# Patient Record
Sex: FEMALE | Race: WHITE | Employment: UNEMPLOYED
[De-identification: names, ages, dates, MRNs, and addresses within clinical notes are randomized per-mention and may not be internally consistent; named-entity substitution may affect disease eponyms.]

---

## 2017-02-06 ENCOUNTER — OFFICE VISIT (OUTPATIENT)
Dept: INTERNAL MEDICINE | Facility: CLINIC | Age: 72
End: 2017-02-06

## 2017-02-06 VITALS
TEMPERATURE: 98.1 F | SYSTOLIC BLOOD PRESSURE: 126 MMHG | RESPIRATION RATE: 16 BRPM | DIASTOLIC BLOOD PRESSURE: 70 MMHG | BODY MASS INDEX: 32.98 KG/M2 | WEIGHT: 179.2 LBS | HEART RATE: 72 BPM

## 2017-02-06 DIAGNOSIS — E11.8 TYPE 2 DIABETES MELLITUS WITH COMPLICATION, WITHOUT LONG-TERM CURRENT USE OF INSULIN (HCC): Primary | ICD-10-CM

## 2017-02-06 LAB — HBA1C MFR BLD: 7.3 %

## 2017-02-06 PROCEDURE — 83036 HEMOGLOBIN GLYCOSYLATED A1C: CPT | Performed by: INTERNAL MEDICINE

## 2017-02-06 PROCEDURE — 99213 OFFICE O/P EST LOW 20 MIN: CPT | Performed by: INTERNAL MEDICINE

## 2017-02-06 RX ORDER — GLIMEPIRIDE 2 MG/1
TABLET ORAL
Qty: 90 TABLET | Refills: 1 | Status: SHIPPED | OUTPATIENT
Start: 2017-02-06 | End: 2017-03-14 | Stop reason: SDUPTHER

## 2017-02-06 ASSESSMENT — PATIENT HEALTH QUESTIONNAIRE - PHQ9
SUM OF ALL RESPONSES TO PHQ QUESTIONS 1-9: 0
SUM OF ALL RESPONSES TO PHQ9 QUESTIONS 1 & 2: 0
1. LITTLE INTEREST OR PLEASURE IN DOING THINGS: 0
2. FEELING DOWN, DEPRESSED OR HOPELESS: 0

## 2017-02-22 ENCOUNTER — TELEPHONE (OUTPATIENT)
Dept: INTERNAL MEDICINE | Facility: CLINIC | Age: 72
End: 2017-02-22

## 2017-03-14 RX ORDER — LOSARTAN POTASSIUM 100 MG/1
TABLET ORAL
Qty: 30 TABLET | Refills: 2 | Status: SHIPPED | OUTPATIENT
Start: 2017-03-14 | End: 2017-06-08 | Stop reason: SDUPTHER

## 2017-03-14 RX ORDER — OMEPRAZOLE 20 MG/1
CAPSULE, DELAYED RELEASE ORAL
Qty: 60 CAPSULE | Refills: 2 | Status: SHIPPED | OUTPATIENT
Start: 2017-03-14 | End: 2017-06-08 | Stop reason: SDUPTHER

## 2017-03-14 RX ORDER — RALOXIFENE HYDROCHLORIDE 60 MG/1
TABLET, FILM COATED ORAL
Qty: 30 TABLET | Refills: 2 | Status: SHIPPED | OUTPATIENT
Start: 2017-03-14 | End: 2017-06-08 | Stop reason: SDUPTHER

## 2017-03-14 RX ORDER — METFORMIN HYDROCHLORIDE 500 MG/1
TABLET, EXTENDED RELEASE ORAL
Qty: 120 TABLET | Refills: 2 | Status: SHIPPED | OUTPATIENT
Start: 2017-03-14 | End: 2017-06-08 | Stop reason: SDUPTHER

## 2017-03-14 RX ORDER — AMLODIPINE BESYLATE 5 MG/1
TABLET ORAL
Qty: 30 TABLET | Refills: 2 | Status: SHIPPED | OUTPATIENT
Start: 2017-03-14 | End: 2017-06-08 | Stop reason: SDUPTHER

## 2017-03-14 RX ORDER — METOPROLOL SUCCINATE 50 MG/1
TABLET, EXTENDED RELEASE ORAL
Qty: 60 TABLET | Refills: 2 | Status: SHIPPED | OUTPATIENT
Start: 2017-03-14 | End: 2017-06-08 | Stop reason: SDUPTHER

## 2017-03-14 RX ORDER — EZETIMIBE 10 MG/1
TABLET ORAL
Qty: 30 TABLET | Refills: 2 | Status: SHIPPED | OUTPATIENT
Start: 2017-03-14 | End: 2017-06-08 | Stop reason: SDUPTHER

## 2017-03-14 RX ORDER — ATORVASTATIN CALCIUM 40 MG/1
TABLET, FILM COATED ORAL
Qty: 30 TABLET | Refills: 2 | Status: SHIPPED | OUTPATIENT
Start: 2017-03-14 | End: 2017-06-08 | Stop reason: SDUPTHER

## 2017-03-14 RX ORDER — GLIMEPIRIDE 2 MG/1
TABLET ORAL
Qty: 30 TABLET | Refills: 2 | Status: SHIPPED | OUTPATIENT
Start: 2017-03-14 | End: 2017-04-04 | Stop reason: SDUPTHER

## 2017-04-04 ENCOUNTER — OFFICE VISIT (OUTPATIENT)
Dept: INTERNAL MEDICINE CLINIC | Age: 72
End: 2017-04-04
Payer: COMMERCIAL

## 2017-04-04 VITALS
TEMPERATURE: 98.1 F | DIASTOLIC BLOOD PRESSURE: 74 MMHG | RESPIRATION RATE: 16 BRPM | WEIGHT: 181.6 LBS | SYSTOLIC BLOOD PRESSURE: 136 MMHG | BODY MASS INDEX: 33.42 KG/M2 | HEART RATE: 80 BPM

## 2017-04-04 DIAGNOSIS — I10 ESSENTIAL HYPERTENSION: ICD-10-CM

## 2017-04-04 DIAGNOSIS — E11.8 TYPE 2 DIABETES MELLITUS WITH COMPLICATION, WITHOUT LONG-TERM CURRENT USE OF INSULIN (HCC): Primary | ICD-10-CM

## 2017-04-04 DIAGNOSIS — H72.91 EARDRUM RUPTURE, RIGHT: ICD-10-CM

## 2017-04-04 PROCEDURE — 99214 OFFICE O/P EST MOD 30 MIN: CPT | Performed by: INTERNAL MEDICINE

## 2017-04-04 RX ORDER — GLIMEPIRIDE 2 MG/1
2 TABLET ORAL 2 TIMES DAILY WITH MEALS
Qty: 60 TABLET | Refills: 5 | Status: SHIPPED | OUTPATIENT
Start: 2017-04-04 | End: 2017-06-14 | Stop reason: SDUPTHER

## 2017-05-08 ENCOUNTER — OFFICE VISIT (OUTPATIENT)
Dept: INTERNAL MEDICINE CLINIC | Age: 72
End: 2017-05-08
Payer: COMMERCIAL

## 2017-05-08 VITALS
RESPIRATION RATE: 25 BRPM | HEIGHT: 62 IN | TEMPERATURE: 98.1 F | HEART RATE: 105 BPM | SYSTOLIC BLOOD PRESSURE: 138 MMHG | WEIGHT: 179.2 LBS | BODY MASS INDEX: 32.97 KG/M2 | OXYGEN SATURATION: 96 % | DIASTOLIC BLOOD PRESSURE: 80 MMHG

## 2017-05-08 DIAGNOSIS — J40 BRONCHITIS: Primary | ICD-10-CM

## 2017-05-08 DIAGNOSIS — R06.2 WHEEZING: ICD-10-CM

## 2017-05-08 PROCEDURE — 96372 THER/PROPH/DIAG INJ SC/IM: CPT | Performed by: INTERNAL MEDICINE

## 2017-05-08 PROCEDURE — 99213 OFFICE O/P EST LOW 20 MIN: CPT | Performed by: INTERNAL MEDICINE

## 2017-05-08 RX ORDER — LEVALBUTEROL INHALATION SOLUTION 1.25 MG/3ML
1 SOLUTION RESPIRATORY (INHALATION) EVERY 4 HOURS PRN
Qty: 48 ML | Refills: 2 | Status: SHIPPED | OUTPATIENT
Start: 2017-05-08

## 2017-05-08 RX ORDER — AZITHROMYCIN 250 MG/1
TABLET, FILM COATED ORAL
Qty: 1 PACKET | Refills: 0 | Status: SHIPPED | OUTPATIENT
Start: 2017-05-08 | End: 2017-05-18

## 2017-05-08 RX ORDER — PREDNISONE 10 MG/1
10 TABLET ORAL
Qty: 15 TABLET | Refills: 0 | Status: SHIPPED | OUTPATIENT
Start: 2017-05-08 | End: 2017-05-13

## 2017-05-08 RX ORDER — METHYLPREDNISOLONE SODIUM SUCCINATE 125 MG/2ML
125 INJECTION, POWDER, LYOPHILIZED, FOR SOLUTION INTRAMUSCULAR; INTRAVENOUS ONCE
Status: COMPLETED | OUTPATIENT
Start: 2017-05-08 | End: 2017-05-08

## 2017-05-08 RX ORDER — LEVALBUTEROL TARTRATE 45 UG/1
1-2 AEROSOL, METERED ORAL
COMMUNITY

## 2017-05-08 RX ADMIN — METHYLPREDNISOLONE SODIUM SUCCINATE 125 MG: 125 INJECTION, POWDER, LYOPHILIZED, FOR SOLUTION INTRAMUSCULAR; INTRAVENOUS at 10:54

## 2017-06-06 ENCOUNTER — OFFICE VISIT (OUTPATIENT)
Dept: INTERNAL MEDICINE CLINIC | Age: 72
End: 2017-06-06
Payer: COMMERCIAL

## 2017-06-06 VITALS
TEMPERATURE: 98.2 F | SYSTOLIC BLOOD PRESSURE: 120 MMHG | DIASTOLIC BLOOD PRESSURE: 62 MMHG | WEIGHT: 179 LBS | RESPIRATION RATE: 24 BRPM | BODY MASS INDEX: 32.94 KG/M2 | HEART RATE: 100 BPM | HEIGHT: 62 IN

## 2017-06-06 DIAGNOSIS — E11.8 TYPE 2 DIABETES MELLITUS WITH COMPLICATION, WITHOUT LONG-TERM CURRENT USE OF INSULIN (HCC): Primary | ICD-10-CM

## 2017-06-06 DIAGNOSIS — I10 ESSENTIAL HYPERTENSION: ICD-10-CM

## 2017-06-06 DIAGNOSIS — E78.00 HIGH CHOLESTEROL: ICD-10-CM

## 2017-06-06 LAB — HBA1C MFR BLD: 8.1 %

## 2017-06-06 PROCEDURE — 83036 HEMOGLOBIN GLYCOSYLATED A1C: CPT | Performed by: INTERNAL MEDICINE

## 2017-06-06 PROCEDURE — 99214 OFFICE O/P EST MOD 30 MIN: CPT | Performed by: INTERNAL MEDICINE

## 2017-06-06 RX ORDER — FLUTICASONE PROPIONATE 50 MCG
2 SPRAY, SUSPENSION (ML) NASAL DAILY
COMMUNITY
Start: 2017-05-12 | End: 2018-08-29 | Stop reason: ALTCHOICE

## 2017-06-08 RX ORDER — METFORMIN HYDROCHLORIDE 500 MG/1
TABLET, EXTENDED RELEASE ORAL
Qty: 120 TABLET | Refills: 5 | Status: SHIPPED | OUTPATIENT
Start: 2017-06-08 | End: 2017-11-27 | Stop reason: SDUPTHER

## 2017-06-08 RX ORDER — ATORVASTATIN CALCIUM 40 MG/1
TABLET, FILM COATED ORAL
Qty: 30 TABLET | Refills: 5 | Status: SHIPPED | OUTPATIENT
Start: 2017-06-08 | End: 2017-11-27 | Stop reason: SDUPTHER

## 2017-06-08 RX ORDER — EZETIMIBE 10 MG/1
TABLET ORAL
Qty: 30 TABLET | Refills: 5 | Status: SHIPPED | OUTPATIENT
Start: 2017-06-08 | End: 2017-11-27 | Stop reason: SDUPTHER

## 2017-06-08 RX ORDER — LOSARTAN POTASSIUM 100 MG/1
TABLET ORAL
Qty: 30 TABLET | Refills: 5 | Status: SHIPPED | OUTPATIENT
Start: 2017-06-08 | End: 2017-11-27 | Stop reason: SDUPTHER

## 2017-06-08 RX ORDER — AMLODIPINE BESYLATE 5 MG/1
TABLET ORAL
Qty: 30 TABLET | Refills: 5 | Status: SHIPPED | OUTPATIENT
Start: 2017-06-08 | End: 2017-11-27 | Stop reason: SDUPTHER

## 2017-06-08 RX ORDER — RALOXIFENE HYDROCHLORIDE 60 MG/1
TABLET, FILM COATED ORAL
Qty: 30 TABLET | Refills: 5 | Status: SHIPPED | OUTPATIENT
Start: 2017-06-08 | End: 2017-11-27 | Stop reason: SDUPTHER

## 2017-06-08 RX ORDER — OMEPRAZOLE 20 MG/1
CAPSULE, DELAYED RELEASE ORAL
Qty: 60 CAPSULE | Refills: 5 | Status: SHIPPED | OUTPATIENT
Start: 2017-06-08 | End: 2017-11-27 | Stop reason: SDUPTHER

## 2017-06-08 RX ORDER — METOPROLOL SUCCINATE 50 MG/1
TABLET, EXTENDED RELEASE ORAL
Qty: 60 TABLET | Refills: 5 | Status: SHIPPED | OUTPATIENT
Start: 2017-06-08 | End: 2017-11-27 | Stop reason: SDUPTHER

## 2017-06-14 RX ORDER — GLIMEPIRIDE 2 MG/1
TABLET ORAL
Qty: 90 TABLET | Refills: 5 | Status: SHIPPED | OUTPATIENT
Start: 2017-06-14 | End: 2017-09-12 | Stop reason: ALTCHOICE

## 2017-06-22 ENCOUNTER — TELEPHONE (OUTPATIENT)
Dept: INTERNAL MEDICINE CLINIC | Age: 72
End: 2017-06-22

## 2017-06-22 RX ORDER — GLIMEPIRIDE 4 MG/1
4 TABLET ORAL 2 TIMES DAILY
Qty: 120 TABLET | Refills: 3 | Status: SHIPPED | OUTPATIENT
Start: 2017-06-22 | End: 2018-05-07 | Stop reason: SDUPTHER

## 2017-06-29 ENCOUNTER — TELEPHONE (OUTPATIENT)
Dept: INTERNAL MEDICINE CLINIC | Age: 72
End: 2017-06-29

## 2017-06-29 RX ORDER — PREDNISONE 10 MG/1
10 TABLET ORAL 3 TIMES DAILY
Qty: 15 TABLET | Refills: 0 | Status: SHIPPED | OUTPATIENT
Start: 2017-06-29 | End: 2017-07-04

## 2017-09-12 ENCOUNTER — OFFICE VISIT (OUTPATIENT)
Dept: INTERNAL MEDICINE CLINIC | Age: 72
End: 2017-09-12
Payer: COMMERCIAL

## 2017-09-12 VITALS
TEMPERATURE: 98.3 F | HEIGHT: 62 IN | HEART RATE: 96 BPM | SYSTOLIC BLOOD PRESSURE: 128 MMHG | BODY MASS INDEX: 32.76 KG/M2 | RESPIRATION RATE: 24 BRPM | WEIGHT: 178 LBS | DIASTOLIC BLOOD PRESSURE: 72 MMHG

## 2017-09-12 DIAGNOSIS — E11.9 TYPE 2 DIABETES MELLITUS WITHOUT COMPLICATION, WITHOUT LONG-TERM CURRENT USE OF INSULIN (HCC): Primary | ICD-10-CM

## 2017-09-12 DIAGNOSIS — E78.00 HIGH CHOLESTEROL: ICD-10-CM

## 2017-09-12 DIAGNOSIS — I10 ESSENTIAL HYPERTENSION: ICD-10-CM

## 2017-09-12 LAB — HBA1C MFR BLD: 7.2 %

## 2017-09-12 PROCEDURE — 83036 HEMOGLOBIN GLYCOSYLATED A1C: CPT | Performed by: INTERNAL MEDICINE

## 2017-09-12 PROCEDURE — 99214 OFFICE O/P EST MOD 30 MIN: CPT | Performed by: INTERNAL MEDICINE

## 2017-09-19 ENCOUNTER — TELEPHONE (OUTPATIENT)
Dept: INTERNAL MEDICINE CLINIC | Age: 72
End: 2017-09-19

## 2017-09-20 ENCOUNTER — NURSE ONLY (OUTPATIENT)
Dept: INTERNAL MEDICINE CLINIC | Age: 72
End: 2017-09-20
Payer: COMMERCIAL

## 2017-09-20 DIAGNOSIS — Z23 NEED FOR INFLUENZA VACCINATION: Primary | ICD-10-CM

## 2017-09-20 DIAGNOSIS — Z23 NEED FOR PNEUMOCOCCAL VACCINE: ICD-10-CM

## 2017-09-20 PROCEDURE — G0009 ADMIN PNEUMOCOCCAL VACCINE: HCPCS | Performed by: INTERNAL MEDICINE

## 2017-09-20 PROCEDURE — 90688 IIV4 VACCINE SPLT 0.5 ML IM: CPT | Performed by: INTERNAL MEDICINE

## 2017-09-20 PROCEDURE — 90732 PPSV23 VACC 2 YRS+ SUBQ/IM: CPT | Performed by: INTERNAL MEDICINE

## 2017-09-20 PROCEDURE — G0008 ADMIN INFLUENZA VIRUS VAC: HCPCS | Performed by: INTERNAL MEDICINE

## 2017-11-08 LAB
ALBUMIN SERPL-MCNC: 3.5 G/DL
ALP BLD-CCNC: 73 U/L
ALT SERPL-CCNC: 14 U/L
ANION GAP SERPL CALCULATED.3IONS-SCNC: NORMAL MMOL/L
AST SERPL-CCNC: 14 U/L
AVERAGE GLUCOSE: 140
BILIRUB SERPL-MCNC: 0.9 MG/DL (ref 0.1–1.4)
BUN BLDV-MCNC: 26 MG/DL
CALCIUM SERPL-MCNC: 9.1 MG/DL
CHLORIDE BLD-SCNC: 107 MMOL/L
CHOLESTEROL, TOTAL: 102 MG/DL
CHOLESTEROL/HDL RATIO: 3.4
CO2: 26 MMOL/L
CREAT SERPL-MCNC: 0.82 MG/DL
CREATININE, URINE: 77.86
GFR CALCULATED: NORMAL
GLUCOSE BLD-MCNC: 119 MG/DL
HBA1C MFR BLD: 6.5 %
HDLC SERPL-MCNC: 30 MG/DL (ref 35–70)
LDL CHOLESTEROL CALCULATED: 48 MG/DL (ref 0–160)
MICROALBUMIN/CREAT 24H UR: 1.2 MG/G{CREAT}
MICROALBUMIN/CREAT UR-RTO: 11.4
POTASSIUM SERPL-SCNC: 4.3 MMOL/L
SODIUM BLD-SCNC: 143 MMOL/L
TOTAL PROTEIN: 5.6
TRIGL SERPL-MCNC: 120 MG/DL
VLDLC SERPL CALC-MCNC: ABNORMAL MG/DL

## 2017-11-27 RX ORDER — OMEPRAZOLE 20 MG/1
CAPSULE, DELAYED RELEASE ORAL
Qty: 60 CAPSULE | Refills: 0 | Status: SHIPPED | OUTPATIENT
Start: 2017-11-27 | End: 2018-01-09 | Stop reason: SDUPTHER

## 2017-11-27 RX ORDER — LOSARTAN POTASSIUM 100 MG/1
TABLET ORAL
Qty: 30 TABLET | Refills: 0 | Status: SHIPPED | OUTPATIENT
Start: 2017-11-27 | End: 2018-01-09 | Stop reason: SDUPTHER

## 2017-11-27 RX ORDER — METFORMIN HYDROCHLORIDE 500 MG/1
TABLET, EXTENDED RELEASE ORAL
Qty: 120 TABLET | Refills: 0 | Status: SHIPPED | OUTPATIENT
Start: 2017-11-27 | End: 2018-01-09 | Stop reason: SDUPTHER

## 2017-11-27 RX ORDER — RALOXIFENE HYDROCHLORIDE 60 MG/1
TABLET, FILM COATED ORAL
Qty: 30 TABLET | Refills: 0 | Status: SHIPPED | OUTPATIENT
Start: 2017-11-27 | End: 2018-01-09 | Stop reason: SDUPTHER

## 2017-11-27 RX ORDER — EZETIMIBE 10 MG/1
TABLET ORAL
Qty: 30 TABLET | Refills: 0 | Status: SHIPPED | OUTPATIENT
Start: 2017-11-27 | End: 2018-01-09 | Stop reason: SDUPTHER

## 2017-11-27 RX ORDER — AMLODIPINE BESYLATE 5 MG/1
TABLET ORAL
Qty: 30 TABLET | Refills: 0 | Status: SHIPPED | OUTPATIENT
Start: 2017-11-27 | End: 2018-01-09 | Stop reason: SDUPTHER

## 2017-11-27 RX ORDER — METOPROLOL SUCCINATE 50 MG/1
TABLET, EXTENDED RELEASE ORAL
Qty: 60 TABLET | Refills: 0 | Status: SHIPPED | OUTPATIENT
Start: 2017-11-27 | End: 2018-01-09 | Stop reason: SDUPTHER

## 2017-11-27 RX ORDER — ATORVASTATIN CALCIUM 40 MG/1
TABLET, FILM COATED ORAL
Qty: 30 TABLET | Refills: 0 | Status: SHIPPED | OUTPATIENT
Start: 2017-11-27 | End: 2018-01-09 | Stop reason: SDUPTHER

## 2017-11-27 RX ORDER — SITAGLIPTIN 100 MG/1
TABLET, FILM COATED ORAL
Qty: 30 TABLET | Refills: 0 | Status: SHIPPED | OUTPATIENT
Start: 2017-11-27 | End: 2017-12-14 | Stop reason: SDUPTHER

## 2017-12-14 ENCOUNTER — OFFICE VISIT (OUTPATIENT)
Dept: INTERNAL MEDICINE CLINIC | Age: 72
End: 2017-12-14
Payer: COMMERCIAL

## 2017-12-14 VITALS
WEIGHT: 184.2 LBS | HEIGHT: 62 IN | SYSTOLIC BLOOD PRESSURE: 124 MMHG | RESPIRATION RATE: 16 BRPM | BODY MASS INDEX: 33.9 KG/M2 | HEART RATE: 92 BPM | DIASTOLIC BLOOD PRESSURE: 72 MMHG

## 2017-12-14 DIAGNOSIS — I10 ESSENTIAL HYPERTENSION: ICD-10-CM

## 2017-12-14 DIAGNOSIS — E78.00 HIGH CHOLESTEROL: ICD-10-CM

## 2017-12-14 DIAGNOSIS — E11.8 TYPE 2 DIABETES MELLITUS WITH COMPLICATION, WITHOUT LONG-TERM CURRENT USE OF INSULIN (HCC): Primary | ICD-10-CM

## 2017-12-14 PROCEDURE — 99214 OFFICE O/P EST MOD 30 MIN: CPT | Performed by: INTERNAL MEDICINE

## 2017-12-14 NOTE — PROGRESS NOTES
Montserrat Jenkins is a 71 y.o. female who presents for   Chief Complaint   Patient presents with    Diabetes     3 MONTH F/U    and follow up of chronic medical problems. Patient Active Problem List   Diagnosis    Asthma    DM (diabetes mellitus) ([address]    HTN (hypertension)     HPI  Here for follow-up on diabetes and blood pressure denies any new complaints    Current Outpatient Prescriptions   Medication Sig Dispense Refill    fluocinonide (LIDEX) 0.05 % cream Apply topically 2 times daily. 1 Tube 0    omeprazole (PRILOSEC) 20 MG delayed release capsule TAKE 2 CAPSULES EVERY MORNING 60 capsule 0    raloxifene (EVISTA) 60 MG tablet TAKE (1) TABLET EVERY MORNING 30 tablet 0    ezetimibe (ZETIA) 10 MG tablet TAKE (1) TABLET IN THE EVENING. 30 tablet 0    amLODIPine (NORVASC) 5 MG tablet TAKE (1) TABLET EVERY MORNING 30 tablet 0    atorvastatin (LIPITOR) 40 MG tablet TAKE (1) TABLET IN THE EVENING. 30 tablet 0    losartan (COZAAR) 100 MG tablet TAKE (1) TABLET EVERY MORNING 30 tablet 0    metFORMIN (GLUCOPHAGE-XR) 500 MG extended release tablet TAKE (2) TABLETS EVERY MORNING AND EVENING 120 tablet 0    metoprolol succinate (TOPROL XL) 50 MG extended release tablet TAKE (1) TABLET EVERY MORNING AND EVENING 60 tablet 0    SITagliptin (JANUVIA) 100 MG tablet Take 1 tablet by mouth daily 90 tablet 0    glimepiride (AMARYL) 4 MG tablet Take 1 tablet by mouth 2 times daily 120 tablet 3    fluticasone (FLONASE) 50 MCG/ACT nasal spray 2 sprays by Nasal route daily      levalbuterol (XOPENEX HFA) 45 MCG/ACT inhaler Inhale 1-2 puffs into the lungs      levalbuterol (XOPENEX) 1.25 MG/3ML nebulizer solution Take 3 mLs by nebulization every 4 hours as needed for Wheezing 48 mL 2    budesonide (PULMICORT FLEXHALER) 180 MCG/ACT AEPB inhaler Take 1 Inhaler by mouth 2 times daily      aspirin 81 MG tablet Take 162 mg by mouth daily      montelukast (SINGULAIR) 10 MG tablet TAKE (1) TABLET IN THE EVENING.  90 tablet 1  Vitamin D (CHOLECALCIFEROL) 1000 UNITS CAPS capsule   Take 2,000 Units by mouth daily       Glucose Blood (BLOOD GLUCOSE TEST STRIPS) STRP Test three times a day 100 strip 3    Lancets MISC Test three times a day 100 each 3    azelastine (ASTELIN) 137 MCG/SPRAY nasal spray USE (1) SPRAY IN EACH NOSTRIL ONCE DAILY AS NEEDED 1 Bottle 5    Multiple Vitamins-Minerals (CENTRUM SILVER PO) Take  by mouth.  Calcium Carbonate-Vitamin D (CALTRATE 600+D PO) Take 600 mg by mouth daily. No current facility-administered medications for this visit.         Allergies   Allergen Reactions    Clindamycin Hives    Pcn [Penicillins] Anaphylaxis    Tetracyclines & Related Hives    Molds & Smuts     Other      Allergy: Clindamycin        Past Medical History:   Diagnosis Date    Cholelithiases 06/2001    Colon polyps     Cystocele     Glucose intolerance (impaired glucose tolerance) 11/1998    Hyperlipidemia 12/1996    Hypertension     Polio     Childhood     Post-polio syndrome     RAD (reactive airway disease)     Rectocele     Seasonal allergies     Type II or unspecified type diabetes mellitus without mention of complication, not stated as uncontrolled 02/2002       Past Surgical History:   Procedure Laterality Date    APPENDECTOMY  02/1997    EYE SURGERY Bilateral 11-13    cataract    HYSTERECTOMY, VAGINAL      SALPINGO-OOPHORECTOMY  02/1997    TONSILLECTOMY AND ADENOIDECTOMY      Childhood     TOTAL KNEE ARTHROPLASTY Right 08/2008    TOTAL KNEE ARTHROPLASTY Left 10/2009       Family History   Problem Relation Age of Onset    High Blood Pressure Mother     Diabetes Paternal Grandmother      ROS   Constitutional:  Negative for fatigue, loss of appetite and unexpected weight change   HEENT            : Negative for neck stiffness and pain, no congestion or sinus pressure   Eyes                : No visual disturbance or pain   Cardiovascular: No chest pain or palpitations or leg swelling   Respiratory      : Negative for cough, shortness of breath or wheezing   Gastrointestinal: Negative for abdominal pain, constipation or diarrhea and bloating No nausea or vomiting   Genitourinary:     No urgency or frequency, no burning or hematuria   Musculoskeletal: No arthralgias, back pain or myalgias   Skin                  : Negative for rash or erythema   Neurological    : Negative for dizziness, weakness, tremors ,light headedness or syncope   Psychiatric       : Negative for dysphoric mood, sleep disturbances, nervous or anxious, or decreased concentration   All other review of systems was negative    Objective  Physical Examination:    Nursing note reviewed    /72 (Site: Left Arm, Position: Sitting, Cuff Size: Large Adult)   Pulse 92   Resp 16   Ht 5' 1.81\" (1.57 m)   Wt 184 lb 3.2 oz (83.6 kg)   LMP 01/01/1990   BMI 33.90 kg/m²   BP Readings from Last 3 Encounters:   12/14/17 124/72   09/12/17 128/72   06/06/17 120/62         Constitutional:  Farida Hernandez is oriented to place, person and time ,appears well-developed and well-nourished  HEENT:  Atraumatic and normocephalic, external ears normal bilaterally, nose normal no oropharyngeal exudate and is clear and moist  Eyes:  EOCM normal; conjunctivae normal; PERRLA bilaterally  Neck:  Normal range of motion, neck supple, no JVD and no thyromegaly  Cardiovascular:  RRR, normal heart sounds and intact distal pulses  Pulmonary:  effort normal and breath sounds normal bilaterally,no wheezes or rales, no respiratory distress  Abdominal:  Soft, non-tender; normal bowel sounds, no masses  Musculoskeletal:  Normal range of motion and no edema or tenderness bilaterally  No lymphadenopathy  Neurological:  alert, oriented, and normal reflexes bilaterally  Skin: warm and dry  Psychiatric:  normal mood and effect; behavior normal.    Labs:   Lab Results   Component Value Date    LABA1C 7.2 09/12/2017     Lab Results   Component Value Date    CHOL Dispense:  1 Tube     Refill:  0     Verbal order per Dr Carin Sweet          Completed Refills               Requested Prescriptions     Signed Prescriptions Disp Refills    fluocinonide (LIDEX) 0.05 % cream 1 Tube 0     Sig: Apply topically 2 times daily. 4. Patient given educational materials - see patient instructions    5. Was a self-tracking handout given in paper form or via [employer]? NO    No orders of the defined types were placed in this encounter. Return in about 4 months (around 4/14/2018). Patient voiced understanding and agreed to treatment plan. Electronically signed by Norma Freed MD on 12/14/2017 at 9:29 AM    This note is created with a voice recognition program and while intend to generate a document that accurately reflects the content of the visit, no guarantee can be provided that every mistake has been identified and corrected by editing.

## 2017-12-14 NOTE — PROGRESS NOTES
Chronic Disease Visit Information    BP Readings from Last 3 Encounters:   09/12/17 128/72   06/06/17 120/62   05/08/17 138/80          Hemoglobin A1C (%)   Date Value   09/12/2017 7.2   06/06/2017 8.1   02/06/2017 7.3     LDL Calculated (mg/dL)   Date Value   06/16/2016 52     HDL (mg/dL)   Date Value   06/16/2016 31 (A)     BUN (mg/dL)   Date Value   03/20/2014 22     CREATININE (no units)   Date Value   03/20/2014 0.80     Glucose (mg/dL)   Date Value   03/20/2014 125            Have you changed or started any medications since your last visit including any over-the-counter medicines, vitamins, or herbal medicines? no   Are you having any side effects from any of your medications? -  no  Have you stopped taking any of your medications? Is so, why? -  no    Have you seen any other physician or provider since your last visit? Yes - Records Requested  Have you had any other diagnostic tests since your last visit? No  Have you been seen in the emergency room and/or had an admission to a hospital since we last saw you? No  Have you had your annual diabetic retinal (eye) exam? Yes - Records Requested  Have you had your routine dental cleaning in the past 6 months? No - dec 21 next apt    Have you activated your [employer] account? If not, what are your barriers?  Yes     Patient Care Team:  Kylah Cuevas MD as PCP - General (Internal Medicine)  Feli Fernández MD as Consulting Physician (Allergy & Immunology)  Nesha Allen MD as Consulting Physician (Cardiology)         Medical History Review  Past Medical, Family, and Social History reviewed and does contribute to the patient presenting condition    Health Maintenance   Topic Date Due    Diabetic microalbuminuria test  06/15/2018 (Originally 6/16/2017)    Lipid screen  06/15/2018 (Originally 6/16/2017)    Diabetic retinal exam  09/12/2018 (Originally 5/25/1955)    Hepatitis C screen  09/12/2018 (Originally 1945)    DTaP/Tdap/Td vaccine (1 - Tdap)

## 2018-01-03 DIAGNOSIS — I10 ESSENTIAL HYPERTENSION: ICD-10-CM

## 2018-01-03 DIAGNOSIS — E11.8 TYPE 2 DIABETES MELLITUS WITH COMPLICATION, WITHOUT LONG-TERM CURRENT USE OF INSULIN (HCC): ICD-10-CM

## 2018-01-03 DIAGNOSIS — E78.00 HIGH CHOLESTEROL: ICD-10-CM

## 2018-01-09 RX ORDER — ATORVASTATIN CALCIUM 40 MG/1
TABLET, FILM COATED ORAL
Qty: 30 TABLET | Refills: 5 | Status: SHIPPED | OUTPATIENT
Start: 2018-01-09 | End: 2018-07-03 | Stop reason: SDUPTHER

## 2018-01-09 RX ORDER — METOPROLOL SUCCINATE 50 MG/1
TABLET, EXTENDED RELEASE ORAL
Qty: 60 TABLET | Refills: 5 | Status: SHIPPED | OUTPATIENT
Start: 2018-01-09 | End: 2018-07-03 | Stop reason: SDUPTHER

## 2018-01-09 RX ORDER — OMEPRAZOLE 20 MG/1
CAPSULE, DELAYED RELEASE ORAL
Qty: 60 CAPSULE | Refills: 5 | Status: SHIPPED | OUTPATIENT
Start: 2018-01-09 | End: 2018-07-03 | Stop reason: SDUPTHER

## 2018-01-09 RX ORDER — METFORMIN HYDROCHLORIDE 500 MG/1
TABLET, EXTENDED RELEASE ORAL
Qty: 120 TABLET | Refills: 5 | Status: SHIPPED | OUTPATIENT
Start: 2018-01-09 | End: 2018-07-03 | Stop reason: SDUPTHER

## 2018-01-09 RX ORDER — AMLODIPINE BESYLATE 5 MG/1
TABLET ORAL
Qty: 30 TABLET | Refills: 5 | Status: SHIPPED | OUTPATIENT
Start: 2018-01-09 | End: 2018-07-03 | Stop reason: SDUPTHER

## 2018-01-09 RX ORDER — EZETIMIBE 10 MG/1
TABLET ORAL
Qty: 30 TABLET | Refills: 5 | Status: SHIPPED | OUTPATIENT
Start: 2018-01-09 | End: 2018-07-03 | Stop reason: SDUPTHER

## 2018-01-09 RX ORDER — LOSARTAN POTASSIUM 100 MG/1
TABLET ORAL
Qty: 30 TABLET | Refills: 5 | Status: SHIPPED | OUTPATIENT
Start: 2018-01-09 | End: 2018-07-03 | Stop reason: SDUPTHER

## 2018-01-09 RX ORDER — RALOXIFENE HYDROCHLORIDE 60 MG/1
TABLET, FILM COATED ORAL
Qty: 30 TABLET | Refills: 5 | Status: SHIPPED | OUTPATIENT
Start: 2018-01-09 | End: 2018-07-03 | Stop reason: SDUPTHER

## 2018-03-28 RX ORDER — SITAGLIPTIN 100 MG/1
TABLET, FILM COATED ORAL
Qty: 30 TABLET | Refills: 0 | Status: SHIPPED | OUTPATIENT
Start: 2018-03-28 | End: 2018-05-07 | Stop reason: SDUPTHER

## 2018-04-13 ENCOUNTER — OFFICE VISIT (OUTPATIENT)
Dept: INTERNAL MEDICINE CLINIC | Age: 73
End: 2018-04-13
Payer: COMMERCIAL

## 2018-04-13 VITALS
WEIGHT: 188.6 LBS | BODY MASS INDEX: 34.71 KG/M2 | HEART RATE: 85 BPM | SYSTOLIC BLOOD PRESSURE: 124 MMHG | OXYGEN SATURATION: 95 % | DIASTOLIC BLOOD PRESSURE: 68 MMHG | HEIGHT: 62 IN | RESPIRATION RATE: 16 BRPM | TEMPERATURE: 97.7 F

## 2018-04-13 DIAGNOSIS — E11.8 TYPE 2 DIABETES MELLITUS WITH COMPLICATION, UNSPECIFIED LONG TERM INSULIN USE STATUS: Primary | ICD-10-CM

## 2018-04-13 DIAGNOSIS — I10 ESSENTIAL HYPERTENSION: ICD-10-CM

## 2018-04-13 DIAGNOSIS — E78.00 HIGH CHOLESTEROL: ICD-10-CM

## 2018-04-13 LAB
GLUCOSE BLD-MCNC: 127 MG/DL
HBA1C MFR BLD: 6.7 %

## 2018-04-13 PROCEDURE — 83036 HEMOGLOBIN GLYCOSYLATED A1C: CPT | Performed by: INTERNAL MEDICINE

## 2018-04-13 PROCEDURE — 82962 GLUCOSE BLOOD TEST: CPT | Performed by: INTERNAL MEDICINE

## 2018-04-13 PROCEDURE — 99214 OFFICE O/P EST MOD 30 MIN: CPT | Performed by: INTERNAL MEDICINE

## 2018-04-13 ASSESSMENT — PATIENT HEALTH QUESTIONNAIRE - PHQ9
SUM OF ALL RESPONSES TO PHQ QUESTIONS 1-9: 0
SUM OF ALL RESPONSES TO PHQ9 QUESTIONS 1 & 2: 0
2. FEELING DOWN, DEPRESSED OR HOPELESS: 0
1. LITTLE INTEREST OR PLEASURE IN DOING THINGS: 0

## 2018-04-16 ENCOUNTER — TELEPHONE (OUTPATIENT)
Dept: INTERNAL MEDICINE CLINIC | Age: 73
End: 2018-04-16

## 2018-04-18 ENCOUNTER — TELEPHONE (OUTPATIENT)
Dept: INTERNAL MEDICINE CLINIC | Age: 73
End: 2018-04-18

## 2018-04-25 ENCOUNTER — TELEPHONE (OUTPATIENT)
Dept: INTERNAL MEDICINE CLINIC | Age: 73
End: 2018-04-25

## 2018-04-27 ENCOUNTER — TELEPHONE (OUTPATIENT)
Dept: INTERNAL MEDICINE CLINIC | Age: 73
End: 2018-04-27

## 2018-04-27 RX ORDER — AZITHROMYCIN 250 MG/1
TABLET, FILM COATED ORAL
Qty: 1 PACKET | Refills: 0 | Status: SHIPPED | OUTPATIENT
Start: 2018-04-27 | End: 2018-08-29 | Stop reason: ALTCHOICE

## 2018-04-27 RX ORDER — PREDNISONE 10 MG/1
10 TABLET ORAL 3 TIMES DAILY
Qty: 15 TABLET | Refills: 0 | Status: SHIPPED | OUTPATIENT
Start: 2018-04-27 | End: 2018-05-02

## 2018-05-07 RX ORDER — SITAGLIPTIN 100 MG/1
TABLET, FILM COATED ORAL
Qty: 30 TABLET | Refills: 5 | Status: SHIPPED | OUTPATIENT
Start: 2018-05-07 | End: 2018-10-01 | Stop reason: SDUPTHER

## 2018-05-07 RX ORDER — GLIMEPIRIDE 4 MG/1
TABLET ORAL
Qty: 60 TABLET | Refills: 5 | Status: SHIPPED | OUTPATIENT
Start: 2018-05-07 | End: 2018-08-29 | Stop reason: ALTCHOICE

## 2018-07-04 RX ORDER — ATORVASTATIN CALCIUM 40 MG/1
TABLET, FILM COATED ORAL
Qty: 30 TABLET | Refills: 3 | Status: SHIPPED | OUTPATIENT
Start: 2018-07-04 | End: 2018-10-01 | Stop reason: SDUPTHER

## 2018-07-04 RX ORDER — EZETIMIBE 10 MG/1
TABLET ORAL
Qty: 30 TABLET | Refills: 3 | Status: SHIPPED | OUTPATIENT
Start: 2018-07-04 | End: 2018-10-01 | Stop reason: SDUPTHER

## 2018-07-04 RX ORDER — AMLODIPINE BESYLATE 5 MG/1
TABLET ORAL
Qty: 30 TABLET | Refills: 3 | Status: SHIPPED | OUTPATIENT
Start: 2018-07-04 | End: 2018-10-01 | Stop reason: SDUPTHER

## 2018-07-04 RX ORDER — OMEPRAZOLE 20 MG/1
CAPSULE, DELAYED RELEASE ORAL
Qty: 60 CAPSULE | Refills: 3 | Status: SHIPPED | OUTPATIENT
Start: 2018-07-04 | End: 2018-10-01 | Stop reason: SDUPTHER

## 2018-07-04 RX ORDER — RALOXIFENE HYDROCHLORIDE 60 MG/1
TABLET, FILM COATED ORAL
Qty: 30 TABLET | Refills: 0 | Status: SHIPPED | OUTPATIENT
Start: 2018-07-04 | End: 2018-08-01 | Stop reason: SDUPTHER

## 2018-07-04 RX ORDER — METFORMIN HYDROCHLORIDE 500 MG/1
TABLET, EXTENDED RELEASE ORAL
Qty: 120 TABLET | Refills: 3 | Status: SHIPPED | OUTPATIENT
Start: 2018-07-04 | End: 2018-10-01 | Stop reason: SDUPTHER

## 2018-07-04 RX ORDER — LOSARTAN POTASSIUM 100 MG/1
TABLET ORAL
Qty: 30 TABLET | Refills: 3 | Status: SHIPPED | OUTPATIENT
Start: 2018-07-04 | End: 2018-10-01 | Stop reason: SDUPTHER

## 2018-07-04 RX ORDER — METOPROLOL SUCCINATE 50 MG/1
TABLET, EXTENDED RELEASE ORAL
Qty: 60 TABLET | Refills: 3 | Status: SHIPPED | OUTPATIENT
Start: 2018-07-04 | End: 2018-10-01 | Stop reason: SDUPTHER

## 2018-08-02 RX ORDER — RALOXIFENE HYDROCHLORIDE 60 MG/1
TABLET, FILM COATED ORAL
Qty: 30 TABLET | Refills: 2 | Status: SHIPPED | OUTPATIENT
Start: 2018-08-02 | End: 2018-10-01 | Stop reason: SDUPTHER

## 2018-08-06 DIAGNOSIS — I10 ESSENTIAL HYPERTENSION: ICD-10-CM

## 2018-08-06 DIAGNOSIS — E78.00 HIGH CHOLESTEROL: ICD-10-CM

## 2018-08-06 DIAGNOSIS — E11.8 TYPE 2 DIABETES MELLITUS WITH COMPLICATION (HCC): ICD-10-CM

## 2018-08-29 ENCOUNTER — OFFICE VISIT (OUTPATIENT)
Dept: INTERNAL MEDICINE CLINIC | Age: 73
End: 2018-08-29
Payer: COMMERCIAL

## 2018-08-29 VITALS
DIASTOLIC BLOOD PRESSURE: 68 MMHG | WEIGHT: 182 LBS | RESPIRATION RATE: 16 BRPM | BODY MASS INDEX: 33.49 KG/M2 | OXYGEN SATURATION: 97 % | SYSTOLIC BLOOD PRESSURE: 126 MMHG | HEIGHT: 62 IN | TEMPERATURE: 98.1 F

## 2018-08-29 DIAGNOSIS — E78.00 HIGH CHOLESTEROL: ICD-10-CM

## 2018-08-29 DIAGNOSIS — I10 ESSENTIAL HYPERTENSION: ICD-10-CM

## 2018-08-29 DIAGNOSIS — E11.8 TYPE 2 DIABETES MELLITUS WITH COMPLICATION, UNSPECIFIED WHETHER LONG TERM INSULIN USE: Primary | ICD-10-CM

## 2018-08-29 PROCEDURE — 99214 OFFICE O/P EST MOD 30 MIN: CPT | Performed by: INTERNAL MEDICINE

## 2018-08-29 RX ORDER — ACETAMINOPHEN AND CODEINE PHOSPHATE 300; 30 MG/1; MG/1
TABLET ORAL
Refills: 0 | COMMUNITY
Start: 2018-08-27 | End: 2018-09-06 | Stop reason: ALTCHOICE

## 2018-08-29 RX ORDER — GLIMEPIRIDE 2 MG/1
2 TABLET ORAL 2 TIMES DAILY
COMMUNITY
End: 2019-08-23 | Stop reason: SDUPTHER

## 2018-08-29 NOTE — PROGRESS NOTES
Rob Vallejo is a 68 y.o. 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 90 g 1    raloxifene (EVISTA) 60 MG tablet TAKE (1) TABLET EVERY MORNING 30 tablet 2    omeprazole (PRILOSEC) 20 MG delayed release capsule TAKE 2 CAPSULES EVERY MORNING 60 capsule 3    ezetimibe (ZETIA) 10 MG tablet TAKE (1) TABLET IN THE EVENING. 30 tablet 3    amLODIPine (NORVASC) 5 MG tablet TAKE (1) TABLET EVERY MORNING 30 tablet 3    atorvastatin (LIPITOR) 40 MG tablet TAKE (1) TABLET IN THE EVENING.  30 tablet 3    losartan (COZAAR) 100 MG tablet TAKE (1) TABLET EVERY MORNING 30 tablet 3    metoprolol succinate (TOPROL XL) 50 MG extended release tablet TAKE (1) TABLET EVERY MORNING AND EVENING 60 tablet 3    metFORMIN (GLUCOPHAGE-XR) 500 MG extended release tablet TAKE (2) TABLETS EVERY MORNING AND EVENING 120 tablet 3    JANUVIA 100 MG tablet TAKE 1 TABLET ONCE DAILY 30 tablet 5    levalbuterol (XOPENEX HFA) 45 MCG/ACT inhaler Inhale 1-2 puffs into the lungs      levalbuterol (XOPENEX) 1.25 MG/3ML nebulizer solution Take 3 mLs by nebulization every 4 hours as needed Relation Age of Onset    High Blood Pressure Mother     Diabetes Paternal Grandmother      ROS   Constitutional:  Negative for fatigue, loss of appetite and unexpected weight change   HEENT            : Negative for neck stiffness and pain, no congestion or sinus pressure   Eyes                : No visual disturbance or pain   Cardiovascular: No chest pain or palpitations or leg swelling   Respiratory      : Negative for cough, shortness of breath or wheezing   Gastrointestinal: Negative for abdominal pain, constipation or diarrhea and bloating No nausea or vomiting   Genitourinary:     No urgency or frequency, no burning or hematuria   Musculoskeletal: No arthralgias, back pain or myalgias   Skin                  : Negative for rash or erythema   Neurological    : Negative for dizziness, weakness, tremors ,light headedness or syncope   Psychiatric       : Negative for dysphoric mood, sleep disturbances, nervous or anxious, or decreased concentration   All other review of systems was negative    Objective  Physical Examination:    Nursing note reviewed    /68 (Site: Left Arm, Position: Sitting, Cuff Size: Large Adult)   Temp 98.1 °F (36.7 °C) (Oral)   Resp 16   Ht 5' 2\" (1.575 m)   Wt 182 lb (82.6 kg)   LMP 01/01/1990   SpO2 97%   Breastfeeding?  No   BMI 33.29 kg/m²   BP Readings from Last 3 Encounters:   08/29/18 126/68   04/13/18 124/68   12/14/17 124/72         Constitutional:  Henna Mariscal is oriented to place, person and time ,appears well-developed and well-nourished  HEENT:  Basal cell removed and ecchymosis present on the right side and normocephalic, external ears normal bilaterally, nose normal no oropharyngeal exudate and is clear and moist  Eyes:  EOCM normal; conjunctivae normal; PERRLA bilaterally  Neck:  Normal range of motion, neck supple, no JVD and no thyromegaly  Cardiovascular:  RRR, normal heart sounds and intact distal pulses  Pulmonary:  effort normal and breath sounds normal bilaterally,no wheezes or rales, no respiratory distress  Abdominal:  Soft, non-tender; normal bowel sounds, no masses  Musculoskeletal:  Normal range of motion and no edema or tenderness bilaterally  No lymphadenopathy  Neurological:  alert, oriented, and normal reflexes bilaterally  Skin: warm and dry  Psychiatric:  normal mood and effect; behavior normal.    Labs:   Lab Results   Component Value Date    LABA1C 6.7 04/13/2018     Lab Results   Component Value Date    CHOL 102 11/08/2017     Lab Results   Component Value Date    HDL 30 (A) 11/08/2017     Lab Results   Component Value Date    LDLCALC 48 11/08/2017     Lab Results   Component Value Date    TRIG 120 11/08/2017     No components found for: CHOLHDL  No results found for: WBC, HGB, HCT, MCV, PLT  No results found for: INR, PROTIME  Lab Results   Component Value Date    GLUCOSE 127 04/13/2018    CREATININE 0.82 11/08/2017    BUN 26 11/08/2017     11/08/2017    K 4.3 11/08/2017     11/08/2017    CO2 26 11/08/2017     Lab Results   Component Value Date    ALT 14 11/08/2017    AST 14 11/08/2017    ALKPHOS 73 11/08/2017    BILITOT 0.9 11/08/2017     Lab Results   Component Value Date    LABALBU 3.5 11/08/2017     No results found for: TSH, CBC  Assessment:  1. Type 2 diabetes mellitus with complication, unspecified whether long term insulin use ([facility]    2. Essential hypertension    3.  High cholesterol        Plan:  Patient's recent hemoglobin A1c 6.6 compared to 6.5 last time and patient's blood sugars are reviewed which were excellent in the last 2-3 months and will continue current treatment including the glimepiride 2 mg tablet 1 in the morning and 1 in the evening and metformin  Patient's blood pressure stable on current medications  Last LDL 52 and HDL 34 and continue current treatment  She had a diabetic foot exam today  Review in 4 months             1.  Shu Olson received counseling on the following healthy behaviors: nutrition and exercise    2. Prior labs and health maintenance reviewed. 3.  Discussed use, benefit, and side effects of prescribed medications. Barriers to medication compliance addressed. All her questions were answered. Pt voiced understanding. Manjula Lu will continue current medications, diet and exercise. Orders Placed This Encounter   Medications    fluocinonide (LIDEX) 0.05 % cream     Sig: Apply topically 2 times daily. Dispense:  90 g     Refill:  1     Verbal order per Dr Chelsey Martines          Completed Refills               Requested Prescriptions     Signed Prescriptions Disp Refills    fluocinonide (LIDEX) 0.05 % cream 90 g 1     Sig: Apply topically 2 times daily. 4. Patient given educational materials - see patient instructions    5. Was a self-tracking handout given in paper form or via [employer]? NO    Orders Placed This Encounter   Procedures    HM DIABETES FOOT EXAM     No Follow-up on file. Patient voiced understanding and agreed to treatment plan. Electronically signed by Sonido Francisco MD on 8/29/2018 at 2:52 PM    This note is created with a voice recognition program and while intend to generate a document that accurately reflects the content of the visit, no guarantee can be provided that every mistake has been identified and corrected by editing.

## 2018-09-06 ENCOUNTER — OFFICE VISIT (OUTPATIENT)
Dept: INTERNAL MEDICINE CLINIC | Age: 73
End: 2018-09-06
Payer: COMMERCIAL

## 2018-09-06 VITALS
BODY MASS INDEX: 33.82 KG/M2 | HEART RATE: 74 BPM | HEIGHT: 62 IN | TEMPERATURE: 98.4 F | RESPIRATION RATE: 16 BRPM | SYSTOLIC BLOOD PRESSURE: 128 MMHG | OXYGEN SATURATION: 97 % | DIASTOLIC BLOOD PRESSURE: 74 MMHG | WEIGHT: 183.8 LBS

## 2018-09-06 DIAGNOSIS — J02.9 PHARYNGITIS, UNSPECIFIED ETIOLOGY: Primary | ICD-10-CM

## 2018-09-06 LAB — S PYO AG THROAT QL: NORMAL

## 2018-09-06 PROCEDURE — 99213 OFFICE O/P EST LOW 20 MIN: CPT | Performed by: INTERNAL MEDICINE

## 2018-09-06 PROCEDURE — 87880 STREP A ASSAY W/OPTIC: CPT | Performed by: INTERNAL MEDICINE

## 2018-09-06 RX ORDER — AZITHROMYCIN 250 MG/1
TABLET, FILM COATED ORAL
Qty: 1 PACKET | Refills: 0 | Status: SHIPPED | OUTPATIENT
Start: 2018-09-06 | End: 2018-09-10

## 2018-09-06 RX ORDER — EZETIMIBE 10 MG/1
TABLET ORAL
COMMUNITY
End: 2018-09-06 | Stop reason: SDUPTHER

## 2018-09-06 NOTE — PROGRESS NOTES
Maninder Chakraborty is a 68 y.o. female who presents for   Chief Complaint   Patient presents with    Pharyngitis     Pt said her sore throat started yesterday and this morning she felt her throat was closing off.    and follow up of chronic medical problems. Patient Active Problem List   Diagnosis    Asthma    DM (diabetes mellitus) ([address]    HTN (hypertension)     HPI  Here for evaluation of sore throat and redness swallowing    Current Outpatient Prescriptions   Medication Sig Dispense Refill    glimepiride (AMARYL) 2 MG tablet Take 2 mg by mouth 2 times daily      fluocinonide (LIDEX) 0.05 % cream Apply topically 2 times daily. 90 g 1    raloxifene (EVISTA) 60 MG tablet TAKE (1) TABLET EVERY MORNING 30 tablet 2    omeprazole (PRILOSEC) 20 MG delayed release capsule TAKE 2 CAPSULES EVERY MORNING 60 capsule 3    ezetimibe (ZETIA) 10 MG tablet TAKE (1) TABLET IN THE EVENING. 30 tablet 3    amLODIPine (NORVASC) 5 MG tablet TAKE (1) TABLET EVERY MORNING 30 tablet 3    atorvastatin (LIPITOR) 40 MG tablet TAKE (1) TABLET IN THE EVENING. 30 tablet 3    losartan (COZAAR) 100 MG tablet TAKE (1) TABLET EVERY MORNING 30 tablet 3    metoprolol succinate (TOPROL XL) 50 MG extended release tablet TAKE (1) TABLET EVERY MORNING AND EVENING 60 tablet 3    metFORMIN (GLUCOPHAGE-XR) 500 MG extended release tablet TAKE (2) TABLETS EVERY MORNING AND EVENING 120 tablet 3    JANUVIA 100 MG tablet TAKE 1 TABLET ONCE DAILY 30 tablet 5    levalbuterol (XOPENEX HFA) 45 MCG/ACT inhaler Inhale 1-2 puffs into the lungs      levalbuterol (XOPENEX) 1.25 MG/3ML nebulizer solution Take 3 mLs by nebulization every 4 hours as needed for Wheezing 48 mL 2    budesonide (PULMICORT FLEXHALER) 180 MCG/ACT AEPB inhaler Take 1 Inhaler by mouth 2 times daily      aspirin 81 MG tablet Take 162 mg by mouth daily      montelukast (SINGULAIR) 10 MG tablet TAKE (1) TABLET IN THE EVENING.  90 tablet 1    Vitamin D (CHOLECALCIFEROL) 1000 UNITS CAPS
11/08/2018    Lipid screen  11/08/2018    A1C test (Diabetic or Prediabetic)  04/13/2019    Potassium monitoring  08/02/2019    Creatinine monitoring  08/02/2019    Diabetic foot exam  08/29/2019    Breast cancer screen  06/18/2020    Colon cancer screen colonoscopy  02/25/2023    DEXA (modify frequency per FRAX score)  Addressed    Pneumococcal low/med risk  Completed

## 2018-10-02 RX ORDER — OMEPRAZOLE 20 MG/1
CAPSULE, DELAYED RELEASE ORAL
Qty: 60 CAPSULE | Refills: 5 | Status: SHIPPED | OUTPATIENT
Start: 2018-10-02 | End: 2019-04-30 | Stop reason: SDUPTHER

## 2018-10-02 RX ORDER — ATORVASTATIN CALCIUM 40 MG/1
TABLET, FILM COATED ORAL
Qty: 30 TABLET | Refills: 5 | Status: SHIPPED | OUTPATIENT
Start: 2018-10-02 | End: 2019-04-30 | Stop reason: SDUPTHER

## 2018-10-02 RX ORDER — METOPROLOL SUCCINATE 50 MG/1
TABLET, EXTENDED RELEASE ORAL
Qty: 60 TABLET | Refills: 5 | Status: SHIPPED | OUTPATIENT
Start: 2018-10-02 | End: 2019-04-30 | Stop reason: SDUPTHER

## 2018-10-02 RX ORDER — LOSARTAN POTASSIUM 100 MG/1
TABLET ORAL
Qty: 30 TABLET | Refills: 5 | Status: SHIPPED | OUTPATIENT
Start: 2018-10-02 | End: 2019-04-30 | Stop reason: SDUPTHER

## 2018-10-02 RX ORDER — AMLODIPINE BESYLATE 5 MG/1
TABLET ORAL
Qty: 30 TABLET | Refills: 5 | Status: SHIPPED | OUTPATIENT
Start: 2018-10-02 | End: 2019-04-30 | Stop reason: SDUPTHER

## 2018-10-02 RX ORDER — EZETIMIBE 10 MG/1
TABLET ORAL
Qty: 30 TABLET | Refills: 5 | Status: SHIPPED | OUTPATIENT
Start: 2018-10-02 | End: 2019-04-30 | Stop reason: SDUPTHER

## 2018-10-02 RX ORDER — SITAGLIPTIN 100 MG/1
TABLET, FILM COATED ORAL
Qty: 30 TABLET | Refills: 5 | Status: SHIPPED | OUTPATIENT
Start: 2018-10-02 | End: 2019-04-30 | Stop reason: SDUPTHER

## 2018-10-02 RX ORDER — METFORMIN HYDROCHLORIDE 500 MG/1
TABLET, EXTENDED RELEASE ORAL
Qty: 120 TABLET | Refills: 5 | Status: SHIPPED | OUTPATIENT
Start: 2018-10-02 | End: 2019-04-30 | Stop reason: SDUPTHER

## 2018-10-02 RX ORDER — RALOXIFENE HYDROCHLORIDE 60 MG/1
TABLET, FILM COATED ORAL
Qty: 30 TABLET | Refills: 5 | Status: SHIPPED | OUTPATIENT
Start: 2018-10-02 | End: 2019-04-30 | Stop reason: SDUPTHER

## 2019-01-22 ENCOUNTER — OFFICE VISIT (OUTPATIENT)
Dept: INTERNAL MEDICINE CLINIC | Age: 74
End: 2019-01-22
Payer: COMMERCIAL

## 2019-01-22 VITALS
DIASTOLIC BLOOD PRESSURE: 72 MMHG | BODY MASS INDEX: 33.71 KG/M2 | SYSTOLIC BLOOD PRESSURE: 122 MMHG | TEMPERATURE: 98.5 F | HEIGHT: 62 IN | WEIGHT: 183.2 LBS | HEART RATE: 86 BPM

## 2019-01-22 DIAGNOSIS — E78.00 HIGH CHOLESTEROL: ICD-10-CM

## 2019-01-22 DIAGNOSIS — E11.8 TYPE 2 DIABETES MELLITUS WITH COMPLICATION, UNSPECIFIED WHETHER LONG TERM INSULIN USE: Primary | ICD-10-CM

## 2019-01-22 DIAGNOSIS — I10 ESSENTIAL HYPERTENSION: ICD-10-CM

## 2019-01-22 DIAGNOSIS — J40 BRONCHITIS: ICD-10-CM

## 2019-01-22 PROCEDURE — 99214 OFFICE O/P EST MOD 30 MIN: CPT | Performed by: INTERNAL MEDICINE

## 2019-01-22 RX ORDER — AZITHROMYCIN 250 MG/1
250 TABLET, FILM COATED ORAL SEE ADMIN INSTRUCTIONS
Qty: 6 TABLET | Refills: 0 | Status: SHIPPED | OUTPATIENT
Start: 2019-01-22 | End: 2019-01-27

## 2019-01-22 RX ORDER — PREDNISONE 20 MG/1
TABLET ORAL
Refills: 0 | COMMUNITY
Start: 2018-11-28 | End: 2019-07-25 | Stop reason: ALTCHOICE

## 2019-01-22 RX ORDER — PREDNISONE 10 MG/1
10 TABLET ORAL
Qty: 15 TABLET | Refills: 0 | Status: SHIPPED | OUTPATIENT
Start: 2019-01-22 | End: 2019-01-27

## 2019-01-22 RX ORDER — AZITHROMYCIN 250 MG/1
TABLET, FILM COATED ORAL
Refills: 0 | COMMUNITY
Start: 2018-11-28 | End: 2019-07-25 | Stop reason: ALTCHOICE

## 2019-03-20 ENCOUNTER — TELEPHONE (OUTPATIENT)
Dept: INTERNAL MEDICINE CLINIC | Age: 74
End: 2019-03-20
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
## 2019-03-20 DIAGNOSIS — R07.81 RIB PAIN ON LEFT SIDE: Primary | ICD-10-CM

## 2019-03-22 ENCOUNTER — TELEPHONE (OUTPATIENT)
Dept: INTERNAL MEDICINE CLINIC | Age: 74
End: 2019-03-22
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
## 2019-03-22 DIAGNOSIS — R07.81 RIB PAIN ON LEFT SIDE: ICD-10-CM

## 2019-04-30 RX ORDER — LOSARTAN POTASSIUM 100 MG/1
TABLET ORAL
Qty: 30 TABLET | Refills: 4 | Status: SHIPPED | OUTPATIENT
Start: 2019-04-30 | End: 2019-10-01 | Stop reason: SDUPTHER

## 2019-04-30 RX ORDER — OMEPRAZOLE 20 MG/1
CAPSULE, DELAYED RELEASE ORAL
Qty: 60 CAPSULE | Refills: 4 | Status: SHIPPED | OUTPATIENT
Start: 2019-04-30 | End: 2019-10-01 | Stop reason: SDUPTHER

## 2019-04-30 RX ORDER — SITAGLIPTIN 100 MG/1
TABLET, FILM COATED ORAL
Qty: 30 TABLET | Refills: 4 | Status: SHIPPED | OUTPATIENT
Start: 2019-04-30 | End: 2019-10-01 | Stop reason: SDUPTHER

## 2019-04-30 RX ORDER — AMLODIPINE BESYLATE 5 MG/1
TABLET ORAL
Qty: 30 TABLET | Refills: 4 | Status: SHIPPED | OUTPATIENT
Start: 2019-04-30 | End: 2019-10-01 | Stop reason: SDUPTHER

## 2019-04-30 RX ORDER — ATORVASTATIN CALCIUM 40 MG/1
TABLET, FILM COATED ORAL
Qty: 30 TABLET | Refills: 4 | Status: SHIPPED | OUTPATIENT
Start: 2019-04-30 | End: 2019-10-01 | Stop reason: SDUPTHER

## 2019-04-30 RX ORDER — EZETIMIBE 10 MG/1
TABLET ORAL
Qty: 30 TABLET | Refills: 4 | Status: SHIPPED | OUTPATIENT
Start: 2019-04-30 | End: 2019-10-01 | Stop reason: SDUPTHER

## 2019-04-30 RX ORDER — METFORMIN HYDROCHLORIDE 500 MG/1
TABLET, EXTENDED RELEASE ORAL
Qty: 120 TABLET | Refills: 4 | Status: SHIPPED | OUTPATIENT
Start: 2019-04-30 | End: 2019-10-01 | Stop reason: SDUPTHER

## 2019-04-30 RX ORDER — RALOXIFENE HYDROCHLORIDE 60 MG/1
TABLET, FILM COATED ORAL
Qty: 30 TABLET | Refills: 4 | Status: SHIPPED | OUTPATIENT
Start: 2019-04-30 | End: 2019-10-01 | Stop reason: SDUPTHER

## 2019-04-30 RX ORDER — METOPROLOL SUCCINATE 50 MG/1
TABLET, EXTENDED RELEASE ORAL
Qty: 60 TABLET | Refills: 4 | Status: SHIPPED | OUTPATIENT
Start: 2019-04-30 | End: 2019-10-01 | Stop reason: SDUPTHER

## 2019-07-25 ENCOUNTER — OFFICE VISIT (OUTPATIENT)
Dept: INTERNAL MEDICINE CLINIC | Age: 74
End: 2019-07-25
Payer: COMMERCIAL

## 2019-07-25 VITALS
SYSTOLIC BLOOD PRESSURE: 122 MMHG | TEMPERATURE: 98 F | RESPIRATION RATE: 16 BRPM | WEIGHT: 182.2 LBS | BODY MASS INDEX: 33.53 KG/M2 | HEART RATE: 80 BPM | DIASTOLIC BLOOD PRESSURE: 64 MMHG | HEIGHT: 62 IN

## 2019-07-25 DIAGNOSIS — E78.00 HIGH CHOLESTEROL: ICD-10-CM

## 2019-07-25 DIAGNOSIS — I10 ESSENTIAL HYPERTENSION: Primary | ICD-10-CM

## 2019-07-25 DIAGNOSIS — E11.8 TYPE 2 DIABETES MELLITUS WITH COMPLICATION, UNSPECIFIED WHETHER LONG TERM INSULIN USE: ICD-10-CM

## 2019-07-25 LAB — HBA1C MFR BLD: 6.4 %

## 2019-07-25 PROCEDURE — 99214 OFFICE O/P EST MOD 30 MIN: CPT | Performed by: INTERNAL MEDICINE

## 2019-07-25 PROCEDURE — 83036 HEMOGLOBIN GLYCOSYLATED A1C: CPT | Performed by: INTERNAL MEDICINE

## 2019-07-25 ASSESSMENT — PATIENT HEALTH QUESTIONNAIRE - PHQ9
1. LITTLE INTEREST OR PLEASURE IN DOING THINGS: 0
2. FEELING DOWN, DEPRESSED OR HOPELESS: 0
SUM OF ALL RESPONSES TO PHQ QUESTIONS 1-9: 0
SUM OF ALL RESPONSES TO PHQ9 QUESTIONS 1 & 2: 0
SUM OF ALL RESPONSES TO PHQ QUESTIONS 1-9: 0

## 2019-07-25 NOTE — PROGRESS NOTES
Visit Information    Have you changed or started any medications since your last visit including any over-the-counter medicines, vitamins, or herbal medicines? no   Are you having any side effects from any of your medications? -  no  Have you stopped taking any of your medications? Is so, why? -  no    Have you seen any other physician or provider since your last visit? Yes - Records Obtained  Have you had any other diagnostic tests since your last visit? Yes - Records Obtained  Have you been seen in the emergency room and/or had an admission to a hospital since we last saw you? No  Have you had your routine dental cleaning in the past 6 months? yes -     Have you activated your [employer] account? If not, what are your barriers?  Yes     Patient Care Team:  Richardson Zapata MD as PCP - General (Internal Medicine)  Richardson Zapata MD as PCP - [facility] Provider  Nayana Montana MD as Consulting Physician (Allergy & Immunology)  Eyad Colon MD as Consulting Physician (Cardiology)    Medical History Review  Past Medical, Family, and Social History reviewed and does contribute to the patient presenting condition    Health Maintenance   Topic Date Due    Hepatitis C screen  1945    Diabetic retinal exam  05/25/1956    Shingles Vaccine (1 of 2) 05/25/1996    DTaP/Tdap/Td vaccine (1 - Tdap) 12/13/2006    Annual Wellness Visit (AWV)  05/25/2009    Diabetic microalbuminuria test  11/08/2018    Lipid screen  11/08/2018    A1C test (Diabetic or Prediabetic)  04/13/2019    Potassium monitoring  08/02/2019    Creatinine monitoring  08/02/2019    Diabetic foot exam  08/29/2019    Flu vaccine (1) 09/01/2019    Breast cancer screen  06/18/2020    Colon cancer screen colonoscopy  04/07/2027    Pneumococcal 65+ years Vaccine  Completed    DEXA (modify frequency per FRAX score)  Addressed

## 2019-07-25 NOTE — PROGRESS NOTES
(SINGULAIR) 10 MG tablet TAKE (1) TABLET IN THE EVENING. 90 tablet 1    Vitamin D (CHOLECALCIFEROL) 1000 UNITS CAPS capsule   Take 2,000 Units by mouth daily       Glucose Blood (BLOOD GLUCOSE TEST STRIPS) STRP Test three times a day 100 strip 3    Lancets MISC Test three times a day 100 each 3    azelastine (ASTELIN) 137 MCG/SPRAY nasal spray USE (1) SPRAY IN EACH NOSTRIL ONCE DAILY AS NEEDED 1 Bottle 5    Multiple Vitamins-Minerals (CENTRUM SILVER PO) Take  by mouth.  Calcium Carbonate-Vitamin D (CALTRATE 600+D PO) Take 600 mg by mouth daily. No current facility-administered medications for this visit.         Allergies   Allergen Reactions    Clindamycin Hives    Pcn [Penicillins] Anaphylaxis    Tetracyclines & Related Hives    Molds & Smuts     Other      Allergy: Clindamycin        Past Medical History:   Diagnosis Date    Cholelithiases 06/2001    Colon polyps     Cystocele     Glucose intolerance (impaired glucose tolerance) 11/1998    Hyperlipidemia 12/1996    Hypertension     Polio     Childhood     Post-polio syndrome     RAD (reactive airway disease)     Rectocele     Seasonal allergies     Type II or unspecified type diabetes mellitus without mention of complication, not stated as uncontrolled 02/2002       Past Surgical History:   Procedure Laterality Date    APPENDECTOMY  02/1997    EYE SURGERY Bilateral 11-13    cataract    HYSTERECTOMY, VAGINAL      SALPINGO-OOPHORECTOMY  02/1997    TONSILLECTOMY AND ADENOIDECTOMY      Childhood     TOTAL KNEE ARTHROPLASTY Right 08/2008    TOTAL KNEE ARTHROPLASTY Left 10/2009       Family History   Problem Relation Age of Onset    High Blood Pressure Mother     Diabetes Paternal Grandmother      ROS   Constitutional:  Negative for fatigue, loss of appetite and unexpected weight change   HEENT            : Negative for neck stiffness and pain, no congestion or sinus pressure   Eyes                : No visual disturbance or pain   Cardiovascular: No chest pain or palpitations or leg swelling   Respiratory      : Negative for cough, shortness of breath or wheezing   Gastrointestinal: Negative for abdominal pain, constipation or diarrhea and bloating No nausea or vomiting   Genitourinary:     No urgency or frequency, no burning or hematuria   Musculoskeletal: No arthralgias, back pain or myalgias   Skin                  : Negative for rash or erythema   Neurological    : Negative for dizziness, weakness, tremors ,light headedness or syncope   Psychiatric       : Negative for dysphoric mood, sleep disturbances, nervous or anxious, or decreased concentration   All other review of systems was negative    Objective  Physical Examination:    Nursing note reviewed    /64 (Site: Right Upper Arm, Position: Sitting, Cuff Size: Large Adult)   Pulse 80   Temp 98 °F (36.7 °C) (Oral)   Resp 16   Ht 5' 2\" (1.575 m)   Wt 182 lb 3.2 oz (82.6 kg)   LMP 01/01/1990   BMI 33.32 kg/m²   BP Readings from Last 3 Encounters:   07/25/19 122/64   01/22/19 122/72   09/06/18 128/74         Constitutional:  Maria Isabel Fernandes is oriented to place, person and time ,appears well-developed and well-nourished  HEENT:  Atraumatic and normocephalic, external ears normal bilaterally, nose normal no oropharyngeal exudate and is clear and moist  Eyes:  EOCM normal; conjunctivae normal; PERRLA bilaterally  Neck:  Normal range of motion, neck supple, no JVD and no thyromegaly  Cardiovascular:  RRR, normal heart sounds and intact distal pulses  Pulmonary:  effort normal and breath sounds normal bilaterally,no wheezes or rales, no respiratory distress  Abdominal:  Soft, non-tender; normal bowel sounds, no masses  Musculoskeletal:  Normal range of motion and no edema or tenderness bilaterally  No lymphadenopathy  Neurological:  alert, oriented, and normal reflexes bilaterally  Skin: warm and dry  Psychiatric:  normal mood and effect; behavior normal.    Labs:   Lab Results   Component Value Date    LABA1C 6.7 04/13/2018     Lab Results   Component Value Date    CHOL 102 11/08/2017     Lab Results   Component Value Date    HDL 30 (A) 11/08/2017     Lab Results   Component Value Date    LDLCALC 48 11/08/2017     Lab Results   Component Value Date    TRIG 120 11/08/2017     No components found for: CHOLHDL  No results found for: WBC, HGB, HCT, MCV, PLT  No results found for: INR, PROTIME  Lab Results   Component Value Date    GLUCOSE 127 04/13/2018    CREATININE 0.82 11/08/2017    BUN 26 11/08/2017     11/08/2017    K 4.3 11/08/2017     11/08/2017    CO2 26 11/08/2017     Lab Results   Component Value Date    ALT 14 11/08/2017    AST 14 11/08/2017    ALKPHOS 73 11/08/2017    BILITOT 0.9 11/08/2017     Lab Results   Component Value Date    LABALBU 3.5 11/08/2017     No results found for: TSH, CBC  Assessment:  1. Essential hypertension    2. Type 2 diabetes mellitus with complication, unspecified whether long term insulin use ([facility] [address]    3. High cholesterol        Plan:  Patient's blood pressure is stable on current medications  Home blood sugars reviewed which were within normal limits and hemoglobin A1c done in the office today and it is 6.4 compared to 7.3 last time and will continue current treatment  Patient is following with allergist for allergies and labs reviewed  Last LDL 64 and HDL 25 and continue current treatment  She had a diabetic foot exam today  Review in 6 months               1.  Hilton Gallardo received counseling on the following healthy behaviors: nutrition and exercise    2. Prior labs and health maintenance reviewed. 3.  Discussed use, benefit, and side effects of prescribed medications. Barriers to medication compliance addressed. All her questions were answered. Pt voiced understanding. Hilton Gallardo will continue current medications, diet and exercise. No orders of the defined types were placed in this encounter.          Completed Refills Requested Prescriptions      No prescriptions requested or ordered in this encounter     4. Patient given educational materials - see patient instructions    5. Was a self-tracking handout given in paper form or via [employer]? NO    No orders of the defined types were placed in this encounter. Return in about 6 months (around 1/25/2020). Patient voiced understanding and agreed to treatment plan. Electronically signed by Ariane Armstrong MD on 7/25/2019 at 11:31 AM    This note is created with a voice recognition program and while intend to generate a document that accurately reflects the content of the visit, no guarantee can be provided that every mistake has been identified and corrected by editing.

## 2019-08-07 ENCOUNTER — TELEPHONE (OUTPATIENT)
Dept: INTERNAL MEDICINE CLINIC | Age: 74
End: 2019-08-07

## 2019-08-07 DIAGNOSIS — E61.1 LOW SERUM IRON: Primary | ICD-10-CM

## 2019-08-07 DIAGNOSIS — D64.9 ANEMIA, UNSPECIFIED TYPE: ICD-10-CM

## 2019-08-23 NOTE — TELEPHONE ENCOUNTER
Last visit: 7-25-19  Last Med refill:   Does patient have enough medication for 72 hours: No:     Next Visit Date:  Future Appointments   Date Time Provider Clarence Mauricio   1/23/2020 11:15 AM Nancy Robles MD [facility] [address] Maintenance   Topic Date Due    Hepatitis C screen  1945    Diabetic retinal exam  05/25/1956    Shingles Vaccine (1 of 2) 05/25/1996    DTaP/Tdap/Td vaccine (1 - Tdap) 12/13/2006    Annual Wellness Visit (AWV)  05/25/2009    Diabetic microalbuminuria test  11/08/2018    Lipid screen  11/08/2018    Potassium monitoring  08/02/2019    Creatinine monitoring  08/02/2019    Diabetic foot exam  08/29/2019    Flu vaccine (1) 09/01/2019    Breast cancer screen  06/18/2020    A1C test (Diabetic or Prediabetic)  07/25/2020    Colon cancer screen colonoscopy  04/07/2027    Pneumococcal 65+ years Vaccine  Completed    DEXA (modify frequency per FRAX score)  Addressed       Hemoglobin A1C (%)   Date Value   07/25/2019 6.4   04/13/2018 6.7   11/08/2017 6.5             ( goal A1C is < 7)   No results found for: LABMICR  LDL Calculated (mg/dL)   Date Value   11/08/2017 48   06/16/2016 52       (goal LDL is <100)   AST (U/L)   Date Value   11/08/2017 14     ALT (U/L)   Date Value   11/08/2017 14     BUN (mg/dL)   Date Value   11/08/2017 26     BP Readings from Last 3 Encounters:   07/25/19 122/64   01/22/19 122/72   09/06/18 128/74          (goal 120/80)    All Future Testing planned in [facility]  Lab Frequency Next Occurrence   Hemoglobin A1C Once 01/22/2019   Comprehensive Metabolic Panel Once 22/59/5135   Microalbumin / Creatinine Urine Ratio Once 01/22/2019   Lipid Panel Once 02/02/2019   CBC Once 01/22/2019   CBC Once 10/07/2019   Iron And TIBC Once 10/07/2019   Ferritin Once 10/07/2019               Patient Active Problem List:     Asthma     DM (diabetes mellitus) ([address]     HTN (hypertension)

## 2019-08-26 RX ORDER — GLIMEPIRIDE 2 MG/1
TABLET ORAL
Qty: 60 TABLET | Refills: 2 | Status: SHIPPED | OUTPATIENT
Start: 2019-08-26 | End: 2019-12-04 | Stop reason: SDUPTHER

## 2019-10-01 RX ORDER — RALOXIFENE HYDROCHLORIDE 60 MG/1
TABLET, FILM COATED ORAL
Qty: 30 TABLET | Refills: 4 | Status: SHIPPED | OUTPATIENT
Start: 2019-10-01 | End: 2020-03-03

## 2019-10-01 RX ORDER — LOSARTAN POTASSIUM 100 MG/1
TABLET ORAL
Qty: 30 TABLET | Refills: 4 | Status: SHIPPED | OUTPATIENT
Start: 2019-10-01 | End: 2020-04-27

## 2019-10-01 RX ORDER — METOPROLOL SUCCINATE 50 MG/1
TABLET, EXTENDED RELEASE ORAL
Qty: 60 TABLET | Refills: 4 | Status: SHIPPED | OUTPATIENT
Start: 2019-10-01 | End: 2020-03-03

## 2019-10-01 RX ORDER — OMEPRAZOLE 20 MG/1
CAPSULE, DELAYED RELEASE ORAL
Qty: 60 CAPSULE | Refills: 4 | Status: SHIPPED | OUTPATIENT
Start: 2019-10-01 | End: 2020-04-27 | Stop reason: ALTCHOICE

## 2019-10-01 RX ORDER — EZETIMIBE 10 MG/1
TABLET ORAL
Qty: 30 TABLET | Refills: 4 | Status: SHIPPED | OUTPATIENT
Start: 2019-10-01 | End: 2020-03-03

## 2019-10-01 RX ORDER — SITAGLIPTIN 100 MG/1
TABLET, FILM COATED ORAL
Qty: 30 TABLET | Refills: 4 | Status: SHIPPED | OUTPATIENT
Start: 2019-10-01 | End: 2020-04-02

## 2019-10-01 RX ORDER — ATORVASTATIN CALCIUM 40 MG/1
TABLET, FILM COATED ORAL
Qty: 30 TABLET | Refills: 4 | Status: SHIPPED | OUTPATIENT
Start: 2019-10-01 | End: 2020-03-03

## 2019-10-01 RX ORDER — METFORMIN HYDROCHLORIDE 500 MG/1
TABLET, EXTENDED RELEASE ORAL
Qty: 120 TABLET | Refills: 4 | Status: SHIPPED | OUTPATIENT
Start: 2019-10-01 | End: 2020-03-03

## 2019-10-01 RX ORDER — AMLODIPINE BESYLATE 5 MG/1
TABLET ORAL
Qty: 30 TABLET | Refills: 4 | Status: SHIPPED | OUTPATIENT
Start: 2019-10-01 | End: 2020-03-03

## 2019-10-02 LAB
BASOPHILS ABSOLUTE: ABNORMAL /ΜL
BASOPHILS RELATIVE PERCENT: ABNORMAL %
EOSINOPHILS ABSOLUTE: ABNORMAL /ΜL
EOSINOPHILS RELATIVE PERCENT: ABNORMAL %
FERRITIN: 5 NG/ML (ref 9–150)
HCT VFR BLD CALC: 31.8 % (ref 36–46)
HEMOGLOBIN: 10.3 G/DL (ref 12–16)
IRON: 39
LYMPHOCYTES ABSOLUTE: ABNORMAL /ΜL
LYMPHOCYTES RELATIVE PERCENT: ABNORMAL %
MCH RBC QN AUTO: 25.6 PG
MCHC RBC AUTO-ENTMCNC: 32.5 G/DL
MCV RBC AUTO: 79 FL
MONOCYTES ABSOLUTE: ABNORMAL /ΜL
MONOCYTES RELATIVE PERCENT: ABNORMAL %
NEUTROPHILS ABSOLUTE: ABNORMAL /ΜL
NEUTROPHILS RELATIVE PERCENT: ABNORMAL %
PLATELET # BLD: 259 K/ΜL
PMV BLD AUTO: 8.8 FL
RBC # BLD: 4.02 10^6/ΜL
TOTAL IRON BINDING CAPACITY: 447
WBC # BLD: 8.6 10^3/ML

## 2019-10-16 DIAGNOSIS — D64.9 ANEMIA, UNSPECIFIED TYPE: ICD-10-CM

## 2019-10-16 DIAGNOSIS — E61.1 LOW SERUM IRON: ICD-10-CM

## 2019-10-30 ENCOUNTER — TELEPHONE (OUTPATIENT)
Dept: INTERNAL MEDICINE CLINIC | Age: 74
End: 2019-10-30

## 2019-10-30 DIAGNOSIS — E61.1 LOW SERUM IRON: Primary | ICD-10-CM

## 2019-10-30 DIAGNOSIS — D64.9 ANEMIA, UNSPECIFIED TYPE: ICD-10-CM

## 2019-10-30 RX ORDER — FERROUS SULFATE 325(65) MG
325 TABLET ORAL 2 TIMES DAILY WITH MEALS
COMMUNITY

## 2019-12-04 RX ORDER — GLIMEPIRIDE 2 MG/1
TABLET ORAL
Qty: 60 TABLET | Refills: 0 | Status: SHIPPED | OUTPATIENT
Start: 2019-12-04 | End: 2019-12-31

## 2019-12-31 RX ORDER — GLIMEPIRIDE 2 MG/1
TABLET ORAL
Qty: 60 TABLET | Refills: 0 | Status: SHIPPED | OUTPATIENT
Start: 2019-12-31 | End: 2020-01-30

## 2020-01-17 LAB
BASOPHILS ABSOLUTE: NORMAL
BASOPHILS RELATIVE PERCENT: NORMAL
EOSINOPHILS ABSOLUTE: NORMAL
EOSINOPHILS RELATIVE PERCENT: NORMAL
FERRITIN: NORMAL
HCT VFR BLD CALC: NORMAL %
HEMOGLOBIN: NORMAL
IRON: NORMAL
LYMPHOCYTES ABSOLUTE: NORMAL
LYMPHOCYTES RELATIVE PERCENT: NORMAL
MCH RBC QN AUTO: NORMAL PG
MCHC RBC AUTO-ENTMCNC: NORMAL G/DL
MCV RBC AUTO: NORMAL FL
MONOCYTES ABSOLUTE: NORMAL
MONOCYTES RELATIVE PERCENT: NORMAL
NEUTROPHILS ABSOLUTE: NORMAL
NEUTROPHILS RELATIVE PERCENT: NORMAL
PLATELET # BLD: NORMAL 10*3/UL
PMV BLD AUTO: NORMAL FL
RBC # BLD: NORMAL 10*6/UL
TOTAL IRON BINDING CAPACITY: NORMAL
WBC # BLD: NORMAL 10*3/UL

## 2020-01-28 ENCOUNTER — OFFICE VISIT (OUTPATIENT)
Dept: INTERNAL MEDICINE CLINIC | Age: 75
End: 2020-01-28
Payer: COMMERCIAL

## 2020-01-28 VITALS
RESPIRATION RATE: 16 BRPM | HEIGHT: 62 IN | SYSTOLIC BLOOD PRESSURE: 110 MMHG | TEMPERATURE: 97.5 F | BODY MASS INDEX: 33.05 KG/M2 | WEIGHT: 179.6 LBS | DIASTOLIC BLOOD PRESSURE: 60 MMHG | HEART RATE: 92 BPM

## 2020-01-28 PROCEDURE — 99214 OFFICE O/P EST MOD 30 MIN: CPT | Performed by: INTERNAL MEDICINE

## 2020-01-28 ASSESSMENT — PATIENT HEALTH QUESTIONNAIRE - PHQ9
SUM OF ALL RESPONSES TO PHQ QUESTIONS 1-9: 0
1. LITTLE INTEREST OR PLEASURE IN DOING THINGS: 0
SUM OF ALL RESPONSES TO PHQ9 QUESTIONS 1 & 2: 0
2. FEELING DOWN, DEPRESSED OR HOPELESS: 0
SUM OF ALL RESPONSES TO PHQ QUESTIONS 1-9: 0

## 2020-01-28 NOTE — PROGRESS NOTES
Brandy Hayes is a 69 y.o. female who presents for   Chief Complaint   Patient presents with    Diabetes     check up- BS still elevated after being on prednisone, was 140s this AM. was  before prednisone    Hypertension     check up    Hyperlipidemia     check up    Cough     has been being treated by Dr Sade Bhagat for bronchitis- was on Zpak, prednisone then doxy and finally better    Discuss Labs     discuss low iron- currently on 325mg BID    and follow up of chronic medical problems. Patient Active Problem List   Diagnosis    Asthma    DM (diabetes mellitus) ([facility] [address]    HTN (hypertension)     HPI  Here for follow-up on anemia and blood counts and also patient had bronchitis and treated with antibiotics and patient also took prednisone and now feeling better but patient's blood sugars are elevated    Current Outpatient Medications   Medication Sig Dispense Refill    glimepiride (AMARYL) 2 MG tablet TAKE (1) TABLET EVERY MORNING AND EVENING 60 tablet 0    ferrous sulfate 325 (65 Fe) MG tablet Take 325 mg by mouth 2 times daily (with meals)      raloxifene (EVISTA) 60 MG tablet TAKE (1) TABLET EVERY MORNING 30 tablet 4    metoprolol succinate (TOPROL XL) 50 MG extended release tablet TAKE (1) TABLET EVERY MORNING AND EVENING 60 tablet 4    JANUVIA 100 MG tablet TAKE 1 TABLET ONCE DAILY 30 tablet 4    metFORMIN (GLUCOPHAGE-XR) 500 MG extended release tablet TAKE (2) TABLETS EVERY MORNING AND EVENING 120 tablet 4    atorvastatin (LIPITOR) 40 MG tablet TAKE (1) TABLET IN THE EVENING. 30 tablet 4    amLODIPine (NORVASC) 5 MG tablet TAKE (1) TABLET EVERY MORNING 30 tablet 4    ezetimibe (ZETIA) 10 MG tablet TAKE (1) TABLET IN THE EVENING.  30 tablet 4    omeprazole (PRILOSEC) 20 MG delayed release capsule TAKE 2 CAPSULES EVERY MORNING 60 capsule 4    losartan (COZAAR) 100 MG tablet TAKE (1) TABLET EVERY MORNING 30 tablet 4    fluocinonide (LIDEX) 0.05 % cream APPLY TO AFFECTED AREA TWICE A DAY 90 g 1  levalbuterol (XOPENEX HFA) 45 MCG/ACT inhaler Inhale 1-2 puffs into the lungs      levalbuterol (XOPENEX) 1.25 MG/3ML nebulizer solution Take 3 mLs by nebulization every 4 hours as needed for Wheezing 48 mL 2    budesonide (PULMICORT FLEXHALER) 180 MCG/ACT AEPB inhaler Take 1 Inhaler by mouth 2 times daily      aspirin 81 MG tablet Take 162 mg by mouth daily      montelukast (SINGULAIR) 10 MG tablet TAKE (1) TABLET IN THE EVENING. 90 tablet 1    Vitamin D (CHOLECALCIFEROL) 1000 UNITS CAPS capsule   Take 2,000 Units by mouth daily       Glucose Blood (BLOOD GLUCOSE TEST STRIPS) STRP Test three times a day 100 strip 3    Lancets MISC Test three times a day 100 each 3    azelastine (ASTELIN) 137 MCG/SPRAY nasal spray USE (1) SPRAY IN EACH NOSTRIL ONCE DAILY AS NEEDED 1 Bottle 5    Multiple Vitamins-Minerals (CENTRUM SILVER PO) Take  by mouth.  Calcium Carbonate-Vitamin D (CALTRATE 600+D PO) Take 600 mg by mouth daily. No current facility-administered medications for this visit.         Allergies   Allergen Reactions    Clindamycin Hives    Pcn [Penicillins] Anaphylaxis    Tetracyclines & Related Hives    Molds & Smuts     Other      Allergy: Clindamycin        Past Medical History:   Diagnosis Date    Cholelithiases 06/2001    Colon polyps     Cystocele     Glucose intolerance (impaired glucose tolerance) 11/1998    Hyperlipidemia 12/1996    Hypertension     Polio     Childhood     Post-polio syndrome     RAD (reactive airway disease)     Rectocele     Seasonal allergies     Type II or unspecified type diabetes mellitus without mention of complication, not stated as uncontrolled 02/2002       Past Surgical History:   Procedure Laterality Date    APPENDECTOMY  02/1997    EYE SURGERY Bilateral 11-13    cataract    HYSTERECTOMY, VAGINAL      SALPINGO-OOPHORECTOMY  02/1997    TONSILLECTOMY AND ADENOIDECTOMY      Childhood     TOTAL KNEE ARTHROPLASTY Right 08/2008    TOTAL KNEE

## 2020-01-29 NOTE — PROGRESS NOTES
Spoke to Dr Amy Fernández office- pt due for Colonoscopy 4/12/2022, but she is also sending dr Hamida Rea a message about anemia  Pt wants [address] hematology    Referral faxed to Dr Cherise Springer and Jersey Silveira

## 2020-01-30 RX ORDER — GLIMEPIRIDE 2 MG/1
TABLET ORAL
Qty: 60 TABLET | Refills: 5 | Status: SHIPPED | OUTPATIENT
Start: 2020-01-30

## 2020-03-03 RX ORDER — LOSARTAN POTASSIUM 50 MG/1
TABLET ORAL
Qty: 60 TABLET | Refills: 5 | Status: SHIPPED | OUTPATIENT
Start: 2020-03-03

## 2020-03-03 RX ORDER — METFORMIN HYDROCHLORIDE 500 MG/1
TABLET, EXTENDED RELEASE ORAL
Qty: 120 TABLET | Refills: 5 | Status: SHIPPED | OUTPATIENT
Start: 2020-03-03

## 2020-03-03 RX ORDER — ATORVASTATIN CALCIUM 40 MG/1
TABLET, FILM COATED ORAL
Qty: 30 TABLET | Refills: 5 | Status: SHIPPED | OUTPATIENT
Start: 2020-03-03

## 2020-03-03 RX ORDER — EZETIMIBE 10 MG/1
TABLET ORAL
Qty: 30 TABLET | Refills: 5 | Status: SHIPPED | OUTPATIENT
Start: 2020-03-03 | End: 2020-04-27 | Stop reason: ALTCHOICE

## 2020-03-03 RX ORDER — RALOXIFENE HYDROCHLORIDE 60 MG/1
TABLET, FILM COATED ORAL
Qty: 30 TABLET | Refills: 5 | Status: SHIPPED | OUTPATIENT
Start: 2020-03-03

## 2020-03-03 RX ORDER — AMLODIPINE BESYLATE 5 MG/1
TABLET ORAL
Qty: 30 TABLET | Refills: 5 | Status: SHIPPED | OUTPATIENT
Start: 2020-03-03

## 2020-03-03 RX ORDER — METOPROLOL SUCCINATE 50 MG/1
TABLET, EXTENDED RELEASE ORAL
Qty: 60 TABLET | Refills: 5 | Status: SHIPPED | OUTPATIENT
Start: 2020-03-03

## 2020-04-02 RX ORDER — SITAGLIPTIN 100 MG/1
TABLET, FILM COATED ORAL
Qty: 30 TABLET | Refills: 3 | Status: SHIPPED | OUTPATIENT
Start: 2020-04-02

## 2020-04-27 RX ORDER — PANTOPRAZOLE SODIUM 40 MG/1
1 TABLET, DELAYED RELEASE ORAL DAILY
COMMUNITY
Start: 2020-03-23

## 2020-04-28 ENCOUNTER — TELEMEDICINE (OUTPATIENT)
Dept: INTERNAL MEDICINE CLINIC | Age: 75
End: 2020-04-28
Payer: COMMERCIAL

## 2020-04-28 VITALS — HEART RATE: 82 BPM | SYSTOLIC BLOOD PRESSURE: 113 MMHG | DIASTOLIC BLOOD PRESSURE: 65 MMHG

## 2020-04-28 PROCEDURE — 99442 PR PHYS/QHP TELEPHONE EVALUATION 11-20 MIN: CPT | Performed by: INTERNAL MEDICINE

## 2020-04-28 NOTE — PROGRESS NOTES
Fentanyl 75 micrograms IV  Complications:        No immediate complications. Procedure:            After I obtained informed consent, the scope was passed                         under direct vision. Throughout the procedure, the                         patient's blood pressure, pulse, and oxygen saturations                         were monitored continuously. The Colonoscope was                         introduced through the anus and advanced to the cecum,                         identified by appendiceal orifice and ileocecal valve.                         The Colonoscope was introduced through the and advanced                         to. The colonoscopy was performed without difficulty.                         The patient tolerated the procedure well. The quality                         of the bowel preparation was good. Findings:       A 5 mm polyp was found in the ascending colon. The polyp was sessile.        The polyp was removed with a cold snare. Resection and retrieval were        complete. Estimated Blood Loss: Estimated blood loss: none. Impression:           - One 5 mm polyp in the ascending colon, removed with a                         cold snare. Resected and retrieved. Recommendation:       - Await pathology results.                        - Patient has a contact number available for                         emergencies. The signs and symptoms of potential                         delayed complications were discussed with the patient.                         Return to normal activities tomorrow. Written discharge                         instructions were provided to the patient. Baron Arevalo. MD Baron Blackwell. Rajendra Whelan MD  2/27/2020 1:54:21 PM  This report has been signed electronically. Amrita Hung MD  Number of Addenda: 0  Note Initiated On: 2/27/2020 1:15 PM   Other Result Information   Interface - Cv Results/Orders In 1 - 02/27/2020  1:56 PM [facility]  Patient Name: Will Ledezma   Procedure Date: 2/27/2020 1:15 PM   MRN: [id number]   CSN: [id number]  YOB: 1945  Admit Type: Outpatient  Age: 69  Room: [facility] 1  Gender: Female  Note Status: Finalized  Attending MD: Drea Trinidad. Elizabeth Weller MD  Procedure:            Colonoscopy  Indications:          Iron deficiency anemia  Providers:            Drea Trinidad. Elizabeth Weller MD  Referring MD:         Yunier Moreno V  Requesting Provider:    Medicines:            Midazolam 4 mg IV, Fentanyl 75 micrograms IV  Complications:        No immediate complications. Procedure:            After I obtained informed consent, the scope was passed                         under direct vision. Throughout the procedure, the                         patient's blood pressure, pulse, and oxygen saturations                         were monitored continuously. The Colonoscope was                         introduced through the anus and advanced to the cecum,                         identified by appendiceal orifice and ileocecal valve. The Colonoscope was introduced through the and advanced                         to. The colonoscopy was performed without difficulty. The patient tolerated the procedure well. The quality                         of the bowel preparation was good. Findings:       A 5 mm polyp was found in the ascending colon. The polyp was sessile. The polyp was removed with a cold snare. Resection and retrieval were        complete. Estimated Blood Loss: Estimated blood loss: none. Impression:           - One 5 mm polyp in the ascending colon, removed with a                         cold snare. Resected and retrieved. Recommendation:       - Await pathology results. - Patient has a contact number available for                         emergencies.  The signs and symptoms of potential                         delayed complications were

## 2020-05-28 ENCOUNTER — TELEPHONE (OUTPATIENT)
Dept: INTERNAL MEDICINE CLINIC | Age: 75
End: 2020-05-28

## 2020-05-28 RX ORDER — PREDNISONE 10 MG/1
10 TABLET ORAL
Qty: 15 TABLET | Refills: 0 | Status: SHIPPED | OUTPATIENT
Start: 2020-05-28 | End: 2020-06-02

## 2020-05-28 NOTE — TELEPHONE ENCOUNTER
Pt called to report right middle finger painful and starting to bend. Ring finger not painful, but had to have ring cut off due to swelling. present since 1 month. f/h rheumatoid arthritis. Asking to have something called in.

## 2020-05-29 LAB
ALBUMIN SERPL-MCNC: NORMAL G/DL
ALP BLD-CCNC: NORMAL U/L
ALT SERPL-CCNC: NORMAL U/L
ANION GAP SERPL CALCULATED.3IONS-SCNC: NORMAL MMOL/L
AST SERPL-CCNC: NORMAL U/L
AVERAGE GLUCOSE: 151
BASOPHILS ABSOLUTE: NORMAL
BASOPHILS RELATIVE PERCENT: NORMAL
BILIRUB SERPL-MCNC: NORMAL MG/DL
BUN BLDV-MCNC: NORMAL MG/DL
CALCIUM SERPL-MCNC: NORMAL MG/DL
CHLORIDE BLD-SCNC: NORMAL MMOL/L
CHOLESTEROL, TOTAL: 125 MG/DL
CHOLESTEROL/HDL RATIO: 4.2
CO2: NORMAL
CREAT SERPL-MCNC: NORMAL MG/DL
CREATININE, URINE: 55.14
EOSINOPHILS ABSOLUTE: NORMAL
EOSINOPHILS RELATIVE PERCENT: NORMAL
FERRITIN: NORMAL
GFR CALCULATED: NORMAL
GLUCOSE BLD-MCNC: NORMAL MG/DL
HBA1C MFR BLD: 6.9 %
HCT VFR BLD CALC: NORMAL %
HDLC SERPL-MCNC: 30 MG/DL (ref 35–70)
HEMOGLOBIN: NORMAL
IRON: NORMAL
LDL CHOLESTEROL CALCULATED: 70 MG/DL (ref 0–160)
LYMPHOCYTES ABSOLUTE: NORMAL
LYMPHOCYTES RELATIVE PERCENT: NORMAL
MCH RBC QN AUTO: NORMAL PG
MCHC RBC AUTO-ENTMCNC: NORMAL G/DL
MCV RBC AUTO: NORMAL FL
MICROALBUMIN/CREAT 24H UR: <0.7 MG/G{CREAT}
MICROALBUMIN/CREAT UR-RTO: <3.6
MONOCYTES ABSOLUTE: NORMAL
MONOCYTES RELATIVE PERCENT: NORMAL
NEUTROPHILS ABSOLUTE: NORMAL
NEUTROPHILS RELATIVE PERCENT: NORMAL
PLATELET # BLD: NORMAL 10*3/UL
PMV BLD AUTO: NORMAL FL
POTASSIUM SERPL-SCNC: NORMAL MMOL/L
RBC # BLD: NORMAL 10*6/UL
RHEUMATOID FACTOR: NORMAL
SEDIMENTATION RATE, ERYTHROCYTE: NORMAL
SODIUM BLD-SCNC: NORMAL MMOL/L
TOTAL IRON BINDING CAPACITY: NORMAL
TOTAL PROTEIN: NORMAL
TRIGL SERPL-MCNC: 125 MG/DL
URIC ACID: NORMAL
VLDLC SERPL CALC-MCNC: 25 MG/DL
WBC # BLD: NORMAL 10*3/UL

## 2020-07-15 ENCOUNTER — TELEPHONE (OUTPATIENT)
Dept: FAMILY MEDICINE CLINIC | Age: 75
End: 2020-07-15

## 2020-07-15 NOTE — TELEPHONE ENCOUNTER
Fernie Lindo was contacted to set up an annual wellness visit    Spoke with: Patient states that  will call and schedule cynthia Tolbert

## 2020-07-23 RX ORDER — GLIMEPIRIDE 2 MG/1
TABLET ORAL
Qty: 60 TABLET | Refills: 0 | OUTPATIENT
Start: 2020-07-23

## 2020-07-23 RX ORDER — SITAGLIPTIN 100 MG/1
TABLET, FILM COATED ORAL
Qty: 30 TABLET | Refills: 0 | OUTPATIENT
Start: 2020-07-23

## 2020-08-20 RX ORDER — LOSARTAN POTASSIUM 50 MG/1
TABLET ORAL
Qty: 60 TABLET | Refills: 0 | OUTPATIENT
Start: 2020-08-20

## 2020-08-20 RX ORDER — ATORVASTATIN CALCIUM 40 MG/1
TABLET, FILM COATED ORAL
Qty: 30 TABLET | Refills: 0 | OUTPATIENT
Start: 2020-08-20

## 2020-08-20 RX ORDER — METOPROLOL SUCCINATE 50 MG/1
TABLET, EXTENDED RELEASE ORAL
Qty: 60 TABLET | Refills: 0 | OUTPATIENT
Start: 2020-08-20

## 2020-08-20 RX ORDER — AMLODIPINE BESYLATE 5 MG/1
TABLET ORAL
Qty: 30 TABLET | Refills: 0 | OUTPATIENT
Start: 2020-08-20

## 2020-08-20 RX ORDER — RALOXIFENE HYDROCHLORIDE 60 MG/1
TABLET, FILM COATED ORAL
Qty: 30 TABLET | Refills: 0 | OUTPATIENT
Start: 2020-08-20

## 2020-08-20 RX ORDER — METFORMIN HYDROCHLORIDE 500 MG/1
TABLET, EXTENDED RELEASE ORAL
Qty: 120 TABLET | Refills: 0 | OUTPATIENT
Start: 2020-08-20

## 2025-03-13 ENCOUNTER — HOSPITAL ENCOUNTER (OUTPATIENT)
Dept: SLEEP CENTER | Age: 80
Discharge: HOME | End: 2025-03-15
Payer: COMMERCIAL

## 2025-03-13 PROCEDURE — 95805 MULTIPLE SLEEP LATENCY TEST: CPT
